# Patient Record
Sex: FEMALE | Race: BLACK OR AFRICAN AMERICAN | Employment: UNEMPLOYED | URBAN - METROPOLITAN AREA
[De-identification: names, ages, dates, MRNs, and addresses within clinical notes are randomized per-mention and may not be internally consistent; named-entity substitution may affect disease eponyms.]

---

## 2017-09-26 ENCOUNTER — OFFICE VISIT (OUTPATIENT)
Dept: FAMILY MEDICINE CLINIC | Age: 62
End: 2017-09-26

## 2017-09-26 ENCOUNTER — HOSPITAL ENCOUNTER (OUTPATIENT)
Dept: LAB | Age: 62
Discharge: HOME | End: 2017-09-26
Payer: SELF-PAY

## 2017-09-26 VITALS
TEMPERATURE: 97.8 F | RESPIRATION RATE: 18 BRPM | WEIGHT: 177 LBS | HEART RATE: 83 BPM | SYSTOLIC BLOOD PRESSURE: 114 MMHG | BODY MASS INDEX: 30.22 KG/M2 | HEIGHT: 64 IN | OXYGEN SATURATION: 95 % | DIASTOLIC BLOOD PRESSURE: 79 MMHG

## 2017-09-26 DIAGNOSIS — E78.5 HYPERLIPIDEMIA, UNSPECIFIED HYPERLIPIDEMIA TYPE: ICD-10-CM

## 2017-09-26 DIAGNOSIS — Z98.2 INTRACRANIAL SHUNT: ICD-10-CM

## 2017-09-26 DIAGNOSIS — I10 ESSENTIAL HYPERTENSION: Primary | ICD-10-CM

## 2017-09-26 DIAGNOSIS — I10 ESSENTIAL HYPERTENSION: ICD-10-CM

## 2017-09-26 DIAGNOSIS — E05.90 HYPERTHYROIDISM: ICD-10-CM

## 2017-09-26 DIAGNOSIS — N39.45 CONTINUOUS LEAKAGE OF URINE: ICD-10-CM

## 2017-09-26 DIAGNOSIS — F32.A DEPRESSION, UNSPECIFIED DEPRESSION TYPE: ICD-10-CM

## 2017-09-26 DIAGNOSIS — Z86.79 HISTORY OF INTRACRANIAL ANEURYSM: ICD-10-CM

## 2017-09-26 LAB
ALBUMIN SERPL-MCNC: 3.7 G/DL (ref 3.4–5)
ALBUMIN/GLOB SERPL: 0.9 {RATIO} (ref 0.8–1.7)
ALP SERPL-CCNC: 94 U/L (ref 45–117)
ALT SERPL-CCNC: 19 U/L (ref 13–56)
ANION GAP SERPL CALC-SCNC: 11 MMOL/L (ref 3–18)
AST SERPL-CCNC: 17 U/L (ref 15–37)
BASOPHILS # BLD: 0 K/UL (ref 0–0.06)
BASOPHILS NFR BLD: 1 % (ref 0–2)
BILIRUB SERPL-MCNC: 0.6 MG/DL (ref 0.2–1)
BUN SERPL-MCNC: 13 MG/DL (ref 7–18)
BUN/CREAT SERPL: 12 (ref 12–20)
CALCIUM SERPL-MCNC: 9.1 MG/DL (ref 8.5–10.1)
CHLORIDE SERPL-SCNC: 107 MMOL/L (ref 100–108)
CHOLEST SERPL-MCNC: 179 MG/DL
CO2 SERPL-SCNC: 24 MMOL/L (ref 21–32)
CREAT SERPL-MCNC: 1.11 MG/DL (ref 0.6–1.3)
DIFFERENTIAL METHOD BLD: ABNORMAL
EOSINOPHIL # BLD: 0.2 K/UL (ref 0–0.4)
EOSINOPHIL NFR BLD: 3 % (ref 0–5)
ERYTHROCYTE [DISTWIDTH] IN BLOOD BY AUTOMATED COUNT: 14.4 % (ref 11.6–14.5)
GLOBULIN SER CALC-MCNC: 3.9 G/DL (ref 2–4)
GLUCOSE SERPL-MCNC: 103 MG/DL (ref 74–99)
HCT VFR BLD AUTO: 40.8 % (ref 35–45)
HDLC SERPL-MCNC: 61 MG/DL (ref 40–60)
HDLC SERPL: 2.9 {RATIO} (ref 0–5)
HGB BLD-MCNC: 12.5 G/DL (ref 12–16)
LDLC SERPL CALC-MCNC: 96.8 MG/DL (ref 0–100)
LIPID PROFILE,FLP: ABNORMAL
LYMPHOCYTES # BLD: 2.3 K/UL (ref 0.9–3.6)
LYMPHOCYTES NFR BLD: 36 % (ref 21–52)
MCH RBC QN AUTO: 26.5 PG (ref 24–34)
MCHC RBC AUTO-ENTMCNC: 30.6 G/DL (ref 31–37)
MCV RBC AUTO: 86.6 FL (ref 74–97)
MONOCYTES # BLD: 0.5 K/UL (ref 0.05–1.2)
MONOCYTES NFR BLD: 7 % (ref 3–10)
NEUTS SEG # BLD: 3.4 K/UL (ref 1.8–8)
NEUTS SEG NFR BLD: 53 % (ref 40–73)
PLATELET # BLD AUTO: 264 K/UL (ref 135–420)
PMV BLD AUTO: 11.8 FL (ref 9.2–11.8)
POTASSIUM SERPL-SCNC: 4.7 MMOL/L (ref 3.5–5.5)
PROT SERPL-MCNC: 7.6 G/DL (ref 6.4–8.2)
RBC # BLD AUTO: 4.71 M/UL (ref 4.2–5.3)
SODIUM SERPL-SCNC: 142 MMOL/L (ref 136–145)
T4 FREE SERPL-MCNC: 1 NG/DL (ref 0.7–1.5)
TRIGL SERPL-MCNC: 106 MG/DL (ref ?–150)
TSH SERPL DL<=0.05 MIU/L-ACNC: 0.38 UIU/ML (ref 0.36–3.74)
VLDLC SERPL CALC-MCNC: 21.2 MG/DL
WBC # BLD AUTO: 6.3 K/UL (ref 4.6–13.2)

## 2017-09-26 PROCEDURE — 85025 COMPLETE CBC W/AUTO DIFF WBC: CPT | Performed by: FAMILY MEDICINE

## 2017-09-26 PROCEDURE — 80053 COMPREHEN METABOLIC PANEL: CPT | Performed by: FAMILY MEDICINE

## 2017-09-26 PROCEDURE — 84443 ASSAY THYROID STIM HORMONE: CPT | Performed by: FAMILY MEDICINE

## 2017-09-26 PROCEDURE — 80061 LIPID PANEL: CPT | Performed by: FAMILY MEDICINE

## 2017-09-26 PROCEDURE — 84439 ASSAY OF FREE THYROXINE: CPT | Performed by: FAMILY MEDICINE

## 2017-09-26 RX ORDER — LABETALOL 200 MG/1
TABLET, FILM COATED ORAL 2 TIMES DAILY
COMMUNITY
End: 2017-09-26 | Stop reason: SDUPTHER

## 2017-09-26 RX ORDER — PAROXETINE HYDROCHLORIDE 20 MG/1
20 TABLET, FILM COATED ORAL DAILY
Qty: 30 TAB | Refills: 3 | Status: SHIPPED | OUTPATIENT
Start: 2017-09-26 | End: 2018-02-15 | Stop reason: SDUPTHER

## 2017-09-26 RX ORDER — AMLODIPINE BESYLATE 10 MG/1
10 TABLET ORAL DAILY
Qty: 30 TAB | Refills: 3 | Status: SHIPPED | OUTPATIENT
Start: 2017-09-26 | End: 2018-03-03 | Stop reason: SDUPTHER

## 2017-09-26 RX ORDER — PAROXETINE HYDROCHLORIDE 20 MG/1
TABLET, FILM COATED ORAL DAILY
COMMUNITY
End: 2017-09-26 | Stop reason: SDUPTHER

## 2017-09-26 RX ORDER — AMLODIPINE BESYLATE 10 MG/1
TABLET ORAL DAILY
COMMUNITY
End: 2017-09-26 | Stop reason: SDUPTHER

## 2017-09-26 RX ORDER — LABETALOL 200 MG/1
200 TABLET, FILM COATED ORAL 2 TIMES DAILY
Qty: 60 TAB | Refills: 3 | Status: SHIPPED | OUTPATIENT
Start: 2017-09-26 | End: 2018-03-03 | Stop reason: SDUPTHER

## 2017-09-26 NOTE — PATIENT INSTRUCTIONS
I can follow you for your medical problems. I am referring you to urology and neurology. You will need to get any disability forms filled out by whatever specialist takes care of your disability.

## 2017-09-26 NOTE — PROGRESS NOTES
HISTORY OF PRESENT ILLNESS  Mildred Orellana is a 63 y.o. female. HPI Comments: Ms. Kirstin Hurst is here with her sister to establish care. She apparently just moved here from [place]. She has a history of a brain aneurysm/subarachnoid hemorrhage in 1997 with a subsequent shunt being placed. She also has a history of htn, depression, hyperthyroidism, all controlled with medication. She needs her 3 medications refilled today. Her medical records also indicate hyperlipidemia, but she isn't on medication for it. She also has chronic urinary incontinence and wants a referral down here to see someone. Establish Care   Pertinent negatives include no chest pain, no abdominal pain, no headaches and no shortness of breath. Review of Systems   Constitutional: Negative for chills and fever. Eyes: Negative for blurred vision and double vision. Respiratory: Negative for cough and shortness of breath. Cardiovascular: Negative for chest pain and palpitations. Gastrointestinal: Negative for abdominal pain, nausea and vomiting. Genitourinary: Negative for dysuria and urgency. Neurological: Negative for headaches. Psychiatric/Behavioral: Positive for depression and memory loss. Negative for suicidal ideas. Physical Exam   Constitutional: Vital signs are normal. She appears well-developed and well-nourished. HENT:   Right Ear: Tympanic membrane and ear canal normal.   Left Ear: Tympanic membrane and ear canal normal.   Nose: Nose normal.   Mouth/Throat: Uvula is midline, oropharynx is clear and moist and mucous membranes are normal.   Eyes: Pupils are equal, round, and reactive to light. Neck: No thyromegaly present. Cardiovascular: Normal rate, regular rhythm and normal heart sounds. Pulmonary/Chest: Effort normal and breath sounds normal. No respiratory distress. She has no wheezes. She has no rales. Abdominal: She exhibits no distension. There is no tenderness.    Neurological:   Cranial nerves 2-12 intact. Strength equal bilaterally. Negative Romberg   Skin: No rash noted. Psychiatric: She has a normal mood and affect. Nursing note and vitals reviewed. ASSESSMENT and PLAN    ICD-10-CM ICD-9-CM    1. Essential hypertension M37 007.3 METABOLIC PANEL, COMPREHENSIVE      amLODIPine (NORVASC) 10 mg tablet      labetalol (NORMODYNE) 200 mg tablet   2. Depression, unspecified depression type F32.9 311 CBC WITH AUTOMATED DIFF      PARoxetine (PAXIL) 20 mg tablet   3. Intracranial shunt Z98.2 V45.2 REFERRAL TO NEUROLOGY   4. History of intracranial aneurysm Z86.79 V12.54 REFERRAL TO NEUROLOGY   5. Hyperlipidemia, unspecified hyperlipidemia type E78.5 272.4 LIPID PANEL   6. Hyperthyroidism E05.90 242.90 TSH 3RD GENERATION      T4, FREE   7. Continuous leakage of urine N39.45 788.37 REFERRAL TO UROLOGY     She had disability papers, I told her that I just met her today, I have no idea what the answer is to 90% of these questions. I advised her that she would have to have the specialist of whatever medical problem that is causing her disability determine what her disability status is.     AVS instructions reviewed with patient, pt verbalized understanding

## 2017-09-26 NOTE — PROGRESS NOTES
Rm:1    Flu Shot Requested: pt declined    Depression Screening:  No flowsheet data found. Learning Assessment:  Learning Assessment 9/26/2017   PRIMARY LEARNER Patient   HIGHEST LEVEL OF EDUCATION - PRIMARY LEARNER  DID NOT GRADUATE HIGH SCHOOL   PRIMARY LANGUAGE ENGLISH   LEARNER PREFERENCE PRIMARY DEMONSTRATION   ANSWERED BY patient   RELATIONSHIP SELF       Abuse Screening:  No flowsheet data found. Health Maintenance reviewed and discussed per provider: n/a     Coordination of Care:    1. Have you been to the ER, urgent care clinic since your last visit? Hospitalized since your last visit? no    2. Have you seen or consulted any other health care providers outside of the [address] [place] [address] since your last visit? Include any pap smears or colon screening.  no

## 2017-09-26 NOTE — MR AVS SNAPSHOT
Visit Information Date & Time Provider Department Dept. Phone Encounter #  
 9/26/2017  9:30 AM Jonatan Bentley  [facility] [phone] [id number] Follow-up Instructions Return if symptoms worsen or fail to improve. Upcoming Health Maintenance Date Due Hepatitis C Screening 1955 DTaP/Tdap/Td series (1 - Tdap) 2/4/1978 PAP AKA CERVICAL CYTOLOGY 2/4/1978 BREAST CANCER SCRN MAMMOGRAM 2/4/2007 FOBT Q 1 YEAR AGE 50-75 2/4/2007 ZOSTER VACCINE AGE 60> 12/4/2016 INFLUENZA AGE 9 TO ADULT 8/1/2017 Allergies as of 9/26/2017  Review Complete On: 9/26/2017 By: Jonatan Bentley MD  
 No Known Allergies Current Immunizations  Never Reviewed No immunizations on file. Not reviewed this visit You Were Diagnosed With   
  
 Codes Comments Essential hypertension    -  Primary ICD-10-CM: I10 
ICD-9-CM: 401.9 Depression, unspecified depression type     ICD-10-CM: F32.9 ICD-9-CM: 460 Intracranial shunt     ICD-10-CM: Z98.2 ICD-9-CM: V45.2 History of intracranial aneurysm     ICD-10-CM: Z86.79 
ICD-9-CM: V12.54 Hyperlipidemia, unspecified hyperlipidemia type     ICD-10-CM: E78.5 ICD-9-CM: 272.4 Hyperthyroidism     ICD-10-CM: E05.90 ICD-9-CM: 242.90 Continuous leakage of urine     ICD-10-CM: N39.45 ICD-9-CM: 788.37 Vitals BP Pulse Temp Resp Height(growth percentile) Weight(growth percentile) 114/79 (BP 1 Location: Right arm, BP Patient Position: Sitting) 83 97.8 °F (36.6 °C) (Oral) 18 5' 4\" (1.626 m) 177 lb (80.3 kg) SpO2 BMI OB Status Smoking Status 95% 30.38 kg/m2 Hysterectomy Former Smoker Vitals History BMI and BSA Data Body Mass Index Body Surface Area  
 30.38 kg/m 2 1.9 m 2 Preferred Pharmacy Pharmacy Name Phone [facility], [address] [place] [address] Your Updated Medication List  
  
   
 This list is accurate as of: 9/26/17  9:47 AM.  Always use your most recent med list. amLODIPine 10 mg tablet Commonly known as:  Claudia Myers Take  by mouth daily. labetalol 200 mg tablet Commonly known as:  Raegan Purdue Take  by mouth two (2) times a day. PARoxetine 20 mg tablet Commonly known as:  PAXIL Take  by mouth daily. We Performed the Following REFERRAL TO NEUROLOGY [HUS09 Custom] REFERRAL TO UROLOGY [WOB096 Custom] Follow-up Instructions Return if symptoms worsen or fail to improve. To-Do List   
 09/26/2017 Lab:  CBC WITH AUTOMATED DIFF   
  
 09/26/2017 Lab:  LIPID PANEL   
  
 09/26/2017 Lab:  METABOLIC PANEL, COMPREHENSIVE   
  
 09/26/2017 Lab:  T4, FREE   
  
 09/26/2017 Lab:  TSH 3RD GENERATION Referral Information Referral ID Referred By Referred To  
  
 [id number] Eusebia Hernandez MD   
   [address] [facility] [address] [place] [facility], [address] Phone: [phone] Fax: [phone] Visits Status Start Date End Date 1 New Request 9/26/17 9/26/18 If your referral has a status of pending review or denied, additional information will be sent to support the outcome of this decision. Referral ID Referred By Referred To  
 [id number] Francisco Javier ALTAMIRANO MD [place], [address] Phone: [phone] Fax: [phone] Visits Status Start Date End Date 1 New Request 9/26/17 9/26/18 If your referral has a status of pending review or denied, additional information will be sent to support the outcome of this decision. Patient Instructions I can follow you for your medical problems. I am referring you to urology and neurology. You will need to get any disability forms filled out by whatever specialist takes care of your disability. Introducing [facility]! Candace Ramos introduces Sookasa patient portal. Now you can access parts of your medical record, email your doctor's office, and request medication refills online. 1. In your internet browser, go to [email or web address]. [email or web address] 2. Click on the First Time User? Click Here link in the Sign In box. You will see the New Member Sign Up page. 3. Enter your Sookasa Access Code exactly as it appears below. You will not need to use this code after youve completed the sign-up process. If you do not sign up before the expiration date, you must request a new code. · Sookasa Access Code: [email or web address] Expires: 12/25/2017  9:47 AM 
 
4. Enter the last four digits of your Social Security Number (xxxx) and Date of Birth (mm/dd/yyyy) as indicated and click Submit. You will be taken to the next sign-up page. 5. Create a Sookasa ID. This will be your Sookasa login ID and cannot be changed, so think of one that is secure and easy to remember. 6. Create a Sookasa password. You can change your password at any time. 7. Enter your Password Reset Question and Answer. This can be used at a later time if you forget your password. 8. Enter your e-mail address. You will receive e-mail notification when new information is available in [address] 9. Click Sign Up. You can now view and download portions of your medical record. 10. Click the Download Summary menu link to download a portable copy of your medical information. If you have questions, please visit the Frequently Asked Questions section of the Sookasa website. Remember, Sookasa is NOT to be used for urgent needs. For medical emergencies, dial 911. Now available from your iPhone and Android! Please provide this summary of care documentation to your next provider. If you have any questions after today's visit, please call [phone].

## 2017-09-29 NOTE — PROGRESS NOTES
Called patient to inform her of her lab results. Patient did not answer the phone and a message was not left due to a full voice mailbox.

## 2017-10-04 NOTE — PROGRESS NOTES
Patient came to the office. Patient was informed Dr. Amarilis Niño reviewed her lab results and it indicated her labs look great. Patient verbalized understanding and had no further questions.

## 2017-10-06 ENCOUNTER — OFFICE VISIT (OUTPATIENT)
Dept: UROLOGY | Age: 62
End: 2017-10-06

## 2017-10-06 VITALS
DIASTOLIC BLOOD PRESSURE: 97 MMHG | OXYGEN SATURATION: 92 % | SYSTOLIC BLOOD PRESSURE: 139 MMHG | BODY MASS INDEX: 30.22 KG/M2 | WEIGHT: 177 LBS | HEIGHT: 64 IN | HEART RATE: 78 BPM

## 2017-10-06 DIAGNOSIS — R32 URINARY INCONTINENCE, UNSPECIFIED TYPE: Primary | ICD-10-CM

## 2017-10-06 DIAGNOSIS — R35.1 NOCTURIA MORE THAN TWICE PER NIGHT: ICD-10-CM

## 2017-10-06 LAB
BILIRUB UR QL STRIP: NEGATIVE
GLUCOSE UR-MCNC: NEGATIVE MG/DL
KETONES P FAST UR STRIP-MCNC: NEGATIVE MG/DL
PH UR STRIP: 6 [PH] (ref 4.6–8)
PROT UR QL STRIP: NEGATIVE MG/DL
SP GR UR STRIP: 1.02 (ref 1–1.03)
UA UROBILINOGEN AMB POC: NORMAL (ref 0.2–1)
URINALYSIS CLARITY POC: CLEAR
URINALYSIS COLOR POC: YELLOW
URINE BLOOD POC: NORMAL
URINE LEUKOCYTES POC: NEGATIVE
URINE NITRITES POC: NEGATIVE

## 2017-10-06 RX ORDER — POTASSIUM CHLORIDE 750 MG/1
TABLET, EXTENDED RELEASE ORAL
Qty: 30 TAB | Refills: 11 | Status: SHIPPED | OUTPATIENT
Start: 2017-10-06 | End: 2018-12-08 | Stop reason: SDUPTHER

## 2017-10-06 RX ORDER — FUROSEMIDE 20 MG/1
TABLET ORAL
Qty: 30 TAB | Refills: 11 | Status: SHIPPED | OUTPATIENT
Start: 2017-10-06 | End: 2018-12-02 | Stop reason: SDUPTHER

## 2017-10-06 NOTE — PROGRESS NOTES
Ms. Nani Urrutia has a reminder for a \"due or due soon\" health maintenance. I have asked that she contact her primary care provider for follow-up on this health maintenance.

## 2017-10-06 NOTE — MR AVS SNAPSHOT
Visit Information Date & Time Provider Department Dept. Phone Encounter #  
 10/6/2017 10:45 AM Juan Humphreys, [address] [place] [address] [facility] [phone] [id number] Follow-up Instructions Return if symptoms worsen or fail to improve. Follow-up and Disposition History Upcoming Health Maintenance Date Due Hepatitis C Screening 1955 DTaP/Tdap/Td series (1 - Tdap) 2/4/1978 PAP AKA CERVICAL CYTOLOGY 2/4/1978 BREAST CANCER SCRN MAMMOGRAM 2/4/2007 FOBT Q 1 YEAR AGE 50-75 2/4/2007 ZOSTER VACCINE AGE 60> 12/4/2016 INFLUENZA AGE 9 TO ADULT 8/1/2017 Allergies as of 10/6/2017  Review Complete On: 10/6/2017 By: Juan Humphreys MD  
  
 Severity Noted Reaction Type Reactions Mushroom  10/06/2017    Swelling Current Immunizations  Never Reviewed No immunizations on file. Not reviewed this visit You Were Diagnosed With   
  
 Codes Comments Urinary incontinence, unspecified type    -  Primary ICD-10-CM: R32 
ICD-9-CM: 788.30 Nocturia more than twice per night     ICD-10-CM: R35.1 ICD-9-CM: 788.43 Vitals BP Pulse Height(growth percentile) Weight(growth percentile) SpO2 BMI  
 (!) 139/97 (BP 1 Location: Left arm, BP Patient Position: Sitting) 78 5' 4\" (1.626 m) 177 lb (80.3 kg) 92% 30.38 kg/m2 OB Status Smoking Status Hysterectomy Former Smoker Vitals History BMI and BSA Data Body Mass Index Body Surface Area  
 30.38 kg/m 2 1.9 m 2 Preferred Pharmacy Pharmacy Name Phone [facility] [address] Your Updated Medication List  
  
   
This list is accurate as of: 10/6/17  1:47 PM.  Always use your most recent med list. amLODIPine 10 mg tablet Commonly known as:  Prakash Ian Take 1 Tab by mouth daily. furosemide 20 mg tablet Commonly known as:  LASIX One tab q d 6pm  
  
 labetalol 200 mg tablet Commonly known as:  Jannetta Pore Take 1 Tab by mouth two (2) times a day. PARoxetine 20 mg tablet Commonly known as:  PAXIL Take 1 Tab by mouth daily. potassium chloride 10 mEq tablet Commonly known as:  KLOR-CON One tab q d 6 pm  
  
  
  
  
Prescriptions Sent to Pharmacy Refills  
 furosemide (LASIX) 20 mg tablet 11 Sig: One tab q d 6pm  
 Class: Normal  
 Pharmacy: [place] [address] [facility] [address] [facility], [address] [facility] [address] Ph #: [phone]  
 potassium chloride (KLOR-CON) 10 mEq tablet 11 Sig: One tab q d 6 pm  
 Class: Normal  
 Pharmacy: [facility] [address] [facility], [address] [place] [address] Ph #: [phone] We Performed the Following AMB POC URINALYSIS DIP STICK AUTO W/O MICRO [51610 CPT(R)] SC MELANIA,POST-VOID RES,US,NON-IMAGING [id number] CPT(R)] Follow-up Instructions Return if symptoms worsen or fail to improve. Patient Instructions Stress Incontinence in Women: Care Instructions Your Care Instructions Stress incontinence is the accidental release of urine caused by activities that put pressure on your bladder. It may happen most often when you sneeze, cough, laugh, jog, or lift something heavy. This condition does not cause major health problems, but it can be embarrassing and interfere with your life. Treatment can cure or improve your symptoms. Follow-up care is a key part of your treatment and safety. Be sure to make and go to all appointments, and call your doctor if you are having problems. It's also a good idea to know your test results and keep a list of the medicines you take. How can you care for yourself at home? · Take your medicines exactly as prescribed. Call your doctor if you think you are having a problem with your medicine. · Limit caffeine and alcohol. They make you urinate more.  
· Do pelvic floor (Kegel) exercises, which tighten and strengthen pelvic muscles. To do Kegel exercises: 
¨ Squeeze the same muscles you would use to stop your urine. Your belly and thighs should not move. ¨ Hold the squeeze for 3 seconds, and then relax for 3 seconds. ¨ Start with 3 seconds. Then add 1 second each week until you are able to squeeze for 10 seconds. ¨ Repeat the exercise 10 to 15 times a session. Do three or more sessions a day. · Try wearing pads that absorb leaks. Or you may want to try to prevent leaks with a product like Poise Impressa, which you insert like a tampon. · Keep skin in the genital area dry. Petroleum jelly (like Vaseline) spread on the area may help protect your skin. When should you call for help? Call your doctor now or seek immediate medical care if: 
· You develop a fever. · You feel like you need to urinate often, or you feel burning or pain when you urinate. · You have a hard time urinating when your bladder feels full. · Your urine looks bloody. Watch closely for changes in your health, and be sure to contact your doctor if: 
· Your bladder feels full even after you urinate. · You do not get better as expected. Where can you learn more? Go to http://hansa-jarrod.info/. Enter S030 in the search box to learn more about \"Stress Incontinence in Women: Care Instructions. \" Current as of: October 13, 2016 Content Version: 11.3 © 8800-1097 SocialEars. Care instructions adapted under license by Payment plugin (which disclaims liability or warranty for this information). If you have questions about a medical condition or this instruction, always ask your healthcare professional. [place] [address] any warranty or liability for your use of this information. Patient Instructions History Introducing [facility]!    
 [facility] introduces [employer] patient portal. Now you can access parts of your medical record, email your doctor's office, and request medication refills online. 1. In your internet browser, go to [email or web address] 2. Click on the First Time User? Click Here link in the Sign In box. You will see the New Member Sign Up page. 3. Enter your Mesuro Access Code exactly as it appears below. You will not need to use this code after youve completed the sign-up process. If you do not sign up before the expiration date, you must request a new code. · Mesuro Access Code: [email or web address] Expires: 12/25/2017  9:47 AM 
 
4. Enter the last four digits of your Social Security Number (xxxx) and Date of Birth (mm/dd/yyyy) as indicated and click Submit. You will be taken to the next sign-up page. 5. Create a Mesuro ID. This will be your Mesuro login ID and cannot be changed, so think of one that is secure and easy to remember. 6. Create a Mesuro password. You can change your password at any time. 7. Enter your Password Reset Question and Answer. This can be used at a later time if you forget your password. 8. Enter your e-mail address. You will receive e-mail notification when new information is available in [address] 9. Click Sign Up. You can now view and download portions of your medical record. 10. Click the Download Summary menu link to download a portable copy of your medical information. If you have questions, please visit the Frequently Asked Questions section of the Mesuro website. Remember, Mesuro is NOT to be used for urgent needs. For medical emergencies, dial 911. Now available from your iPhone and Android! Please provide this summary of care documentation to your next provider. Your primary care clinician is listed as Bladimir Henderson. If you have any questions after today's visit, please call [phone].

## 2017-10-06 NOTE — PATIENT INSTRUCTIONS
Stress Incontinence in Women: Care Instructions  Your Care Instructions  Stress incontinence is the accidental release of urine caused by activities that put pressure on your bladder. It may happen most often when you sneeze, cough, laugh, jog, or lift something heavy. This condition does not cause major health problems, but it can be embarrassing and interfere with your life. Treatment can cure or improve your symptoms. Follow-up care is a key part of your treatment and safety. Be sure to make and go to all appointments, and call your doctor if you are having problems. It's also a good idea to know your test results and keep a list of the medicines you take. How can you care for yourself at home? · Take your medicines exactly as prescribed. Call your doctor if you think you are having a problem with your medicine. · Limit caffeine and alcohol. They make you urinate more. · Do pelvic floor (Kegel) exercises, which tighten and strengthen pelvic muscles. To do Kegel exercises:  ¨ Squeeze the same muscles you would use to stop your urine. Your belly and thighs should not move. ¨ Hold the squeeze for 3 seconds, and then relax for 3 seconds. ¨ Start with 3 seconds. Then add 1 second each week until you are able to squeeze for 10 seconds. ¨ Repeat the exercise 10 to 15 times a session. Do three or more sessions a day. · Try wearing pads that absorb leaks. Or you may want to try to prevent leaks with a product like Poise Impressa, which you insert like a tampon. · Keep skin in the genital area dry. Petroleum jelly (like Vaseline) spread on the area may help protect your skin. When should you call for help? Call your doctor now or seek immediate medical care if:  · You develop a fever. · You feel like you need to urinate often, or you feel burning or pain when you urinate. · You have a hard time urinating when your bladder feels full. · Your urine looks bloody.   Watch closely for changes in your health, and be sure to contact your doctor if:  · Your bladder feels full even after you urinate. · You do not get better as expected. Where can you learn more? Go to http://hansa-jarrod.info/. Enter B632 in the search box to learn more about \"Stress Incontinence in Women: Care Instructions. \"  Current as of: October 13, 2016  Content Version: 11.3  © 5141-8961 [employer]. Care instructions adapted under license by Whereoscope (which disclaims liability or warranty for this information). If you have questions about a medical condition or this instruction, always ask your healthcare professional. [address] any warranty or liability for your use of this information.

## 2017-10-06 NOTE — PROGRESS NOTES
Felicia Lynn    Chief Complaint   Patient presents with   [address] [place] [address]    Incontinence       History and Physical    Patient is a pleasant 62-year-old -American female with an issue with nighttime urinating. During the day, she has absolutely no problems at all. She gets a normal urge to urinate and is able to get to the bathroom in time and voids with a good stream with completion and with no sense of dysuria and no hematuria and no history of urinary tract infections. There is no incontinence during the day. The patient states that her nighttime problems began when she had a stroke in 1998 and this also then prompted the placement of a ventriculoperitoneal shunt. The patient apparently has a recent diagnosis of hypertension and has a Norvasc prescription is called in but she has not yet begun it    Past Medical History:   Diagnosis Date    Arthritis     Depression     Hypercholesterolemia     Hypertension     Stroke ([facility] [address]     Thyroid disease      There is no problem list on file for this patient. Past Surgical History:   Procedure Laterality Date    HX COLONOSCOPY      HX GYN      HX HYSTERECTOMY      HX INTRACRANIAL ANEURYSM REPAIR       Current Outpatient Prescriptions   Medication Sig Dispense Refill    amLODIPine (NORVASC) 10 mg tablet Take 1 Tab by mouth daily. 30 Tab 3    PARoxetine (PAXIL) 20 mg tablet Take 1 Tab by mouth daily. 30 Tab 3    labetalol (NORMODYNE) 200 mg tablet Take 1 Tab by mouth two (2) times a day. 60 Tab 3     Allergies   Allergen Reactions    Mushroom Swelling     Social History     Social History    Marital status: SINGLE     Spouse name: N/A    Number of children: N/A    Years of education: N/A     Occupational History    Not on file.      Social History Main Topics    Smoking status: Former Smoker    Smokeless tobacco: Never Used    Alcohol use No    Drug use: No    Sexual activity: Yes     Partners: Male     Birth control/ protection: Condom Other Topics Concern    Not on file     Social History Narrative      Family History   Problem Relation Age of Onset    Cancer Mother              Visit Vitals    BP (!) 139/97 (BP 1 Location: Left arm, BP Patient Position: Sitting)    Pulse 78    Ht 5' 4\" (1.626 m)    Wt 177 lb (80.3 kg)    SpO2 92%    BMI 30.38 kg/m2     Physical        Gen: WDWN adult NAD  Head  : normocephalic,  Normal ROM; eyes without normal pupils, EOMs, no masses;  conjunctiva normal  Neck: normal movement,  no evident mass,  No evident adenopathy, trachea midline,  Lungs clear to auscultation with no rales or ronchi or rubs  Cardiac NSR with no murmur, rub, extra sounds  Abd :bowel sounds normal, no masses, tenderness, organomegaly  Flanks  nontender to punch percussion and to bimanual renal fossa palpation  -introitus normal.  Urethral meatus normal.  Urethra palpates normally. Bimanual palpation reveals no bladder masses    Extremities there is 2+ pitting edema up to the patella  Psych- oriented, no evident anxiety, no cognitive impairment evident    Bladder scan reveals 23 cc residual  Urine is trace positive for blood but on microscopic I only see 0-2 red cells and no casts                  Impression/ PLAN  Nighttime high-volume polyuria. The patient is afraid to sleep because she will have incontinence at those times  With pitting edema the patient obviously has lower extremity fluid sequestration    Plan:  Fully discussed with patient and  I am going to start the patient on Lasix to be taken at 6 PM.  Potassium chloride also given. The patient is aware of the impact on the blood pressure. If the patient continues to have high-volume nocturia we need to consider whether or not to give DDAVP to directly impact the brain.   If there is continued nighttime incontinence with correction of the volumes, we would then consider anticholinergics at bedtime              This visit exceeded 30 minutes and >50% was counselling  The patient understands the discussion and plan    PLEASE NOTE:      This document has been produced using voice recognition software.   Unrecognized errors in transcription may be present    Sudeep Uribe MD

## 2017-10-06 NOTE — PROGRESS NOTES
Ms. Kwesi Faulkner has a reminder for a \"due or due soon\" health maintenance. I have asked that she contact her primary care provider for follow-up on this health maintenance.

## 2017-10-27 ENCOUNTER — HOSPITAL ENCOUNTER (EMERGENCY)
Age: 62
Discharge: HOME | End: 2017-10-27
Attending: EMERGENCY MEDICINE
Payer: MEDICARE

## 2017-10-27 VITALS
DIASTOLIC BLOOD PRESSURE: 82 MMHG | SYSTOLIC BLOOD PRESSURE: 110 MMHG | RESPIRATION RATE: 16 BRPM | HEART RATE: 90 BPM | OXYGEN SATURATION: 100 % | TEMPERATURE: 97.8 F | WEIGHT: 174 LBS | BODY MASS INDEX: 29.87 KG/M2

## 2017-10-27 DIAGNOSIS — R19.7 DIARRHEA, UNSPECIFIED TYPE: ICD-10-CM

## 2017-10-27 DIAGNOSIS — R11.2 NON-INTRACTABLE VOMITING WITH NAUSEA, UNSPECIFIED VOMITING TYPE: Primary | ICD-10-CM

## 2017-10-27 DIAGNOSIS — R53.83 FATIGUE, UNSPECIFIED TYPE: ICD-10-CM

## 2017-10-27 LAB
ALBUMIN SERPL-MCNC: 3.8 G/DL (ref 3.4–5)
ALBUMIN/GLOB SERPL: 0.8 {RATIO} (ref 0.8–1.7)
ALP SERPL-CCNC: 106 U/L (ref 45–117)
ALT SERPL-CCNC: 29 U/L (ref 13–56)
ANION GAP SERPL CALC-SCNC: 7 MMOL/L (ref 3–18)
AST SERPL-CCNC: 19 U/L (ref 15–37)
BASOPHILS # BLD: 0 K/UL (ref 0–0.06)
BASOPHILS NFR BLD: 0 % (ref 0–2)
BILIRUB DIRECT SERPL-MCNC: 0.2 MG/DL (ref 0–0.2)
BILIRUB SERPL-MCNC: 0.8 MG/DL (ref 0.2–1)
BUN SERPL-MCNC: 26 MG/DL (ref 7–18)
BUN/CREAT SERPL: 19 (ref 12–20)
CALCIUM SERPL-MCNC: 9.2 MG/DL (ref 8.5–10.1)
CHLORIDE SERPL-SCNC: 109 MMOL/L (ref 100–108)
CO2 SERPL-SCNC: 26 MMOL/L (ref 21–32)
CREAT SERPL-MCNC: 1.34 MG/DL (ref 0.6–1.3)
DIFFERENTIAL METHOD BLD: ABNORMAL
EOSINOPHIL # BLD: 0 K/UL (ref 0–0.4)
EOSINOPHIL NFR BLD: 0 % (ref 0–5)
ERYTHROCYTE [DISTWIDTH] IN BLOOD BY AUTOMATED COUNT: 13.9 % (ref 11.6–14.5)
GLOBULIN SER CALC-MCNC: 4.6 G/DL (ref 2–4)
GLUCOSE SERPL-MCNC: 129 MG/DL (ref 74–99)
HCT VFR BLD AUTO: 42.2 % (ref 35–45)
HGB BLD-MCNC: 13.4 G/DL (ref 12–16)
LIPASE SERPL-CCNC: 454 U/L (ref 73–393)
LYMPHOCYTES # BLD: 0.7 K/UL (ref 0.9–3.6)
LYMPHOCYTES NFR BLD: 9 % (ref 21–52)
MCH RBC QN AUTO: 26.9 PG (ref 24–34)
MCHC RBC AUTO-ENTMCNC: 31.8 G/DL (ref 31–37)
MCV RBC AUTO: 84.6 FL (ref 74–97)
MONOCYTES # BLD: 0.2 K/UL (ref 0.05–1.2)
MONOCYTES NFR BLD: 3 % (ref 3–10)
NEUTS SEG # BLD: 6.6 K/UL (ref 1.8–8)
NEUTS SEG NFR BLD: 88 % (ref 40–73)
PLATELET # BLD AUTO: 244 K/UL (ref 135–420)
PMV BLD AUTO: 11.2 FL (ref 9.2–11.8)
POTASSIUM SERPL-SCNC: 4 MMOL/L (ref 3.5–5.5)
PROT SERPL-MCNC: 8.4 G/DL (ref 6.4–8.2)
RBC # BLD AUTO: 4.99 M/UL (ref 4.2–5.3)
SODIUM SERPL-SCNC: 142 MMOL/L (ref 136–145)
WBC # BLD AUTO: 7.5 K/UL (ref 4.6–13.2)

## 2017-10-27 PROCEDURE — 85025 COMPLETE CBC W/AUTO DIFF WBC: CPT | Performed by: EMERGENCY MEDICINE

## 2017-10-27 PROCEDURE — 99281 EMR DPT VST MAYX REQ PHY/QHP: CPT

## 2017-10-27 PROCEDURE — 80048 BASIC METABOLIC PNL TOTAL CA: CPT | Performed by: EMERGENCY MEDICINE

## 2017-10-27 PROCEDURE — 80076 HEPATIC FUNCTION PANEL: CPT | Performed by: EMERGENCY MEDICINE

## 2017-10-27 PROCEDURE — 83690 ASSAY OF LIPASE: CPT | Performed by: EMERGENCY MEDICINE

## 2017-10-27 RX ORDER — ONDANSETRON 4 MG/1
8 TABLET, FILM COATED ORAL
Qty: 12 TAB | Refills: 0 | Status: SHIPPED | OUTPATIENT
Start: 2017-10-27

## 2017-10-27 NOTE — DISCHARGE INSTRUCTIONS
SPECIFIC PATIENT INSTRUCTIONS FROM THE PHYSICIAN WHO TREATED YOU IN THE ER TODAY:  1. Zofran for nausea and/or vomiting. 2. Over the counter imodium for diarrhea. 3. FOLLOW UP APPOINTMENT:  Your primary doctor in 3-4 days. Diarrhea: Care Instructions  Your Care Instructions    Diarrhea is loose, watery stools (bowel movements). The exact cause is often hard to find. Sometimes diarrhea is your body's way of getting rid of what caused an upset stomach. Viruses, food poisoning, and many medicines can cause diarrhea. Some people get diarrhea in response to emotional stress, anxiety, or certain foods. Almost everyone has diarrhea now and then. It usually isn't serious, and your stools will return to normal soon. The important thing to do is replace the fluids you have lost, so you can prevent dehydration. The doctor has checked you carefully, but problems can develop later. If you notice any problems or new symptoms, get medical treatment right away. Follow-up care is a key part of your treatment and safety. Be sure to make and go to all appointments, and call your doctor if you are having problems. It's also a good idea to know your test results and keep a list of the medicines you take. How can you care for yourself at home? · Watch for signs of dehydration, which means your body has lost too much water. Dehydration is a serious condition and should be treated right away. Signs of dehydration are:  ¨ Increasing thirst and dry eyes and mouth. ¨ Feeling faint or lightheaded. ¨ Darker urine, and a smaller amount of urine than normal.  · To prevent dehydration, drink plenty of fluids, enough so that your urine is light yellow or clear like water. Choose water and other caffeine-free clear liquids until you feel better. If you have kidney, heart, or liver disease and have to limit fluids, talk with your doctor before you increase the amount of fluids you drink.   · Begin eating small amounts of mild foods the next day, if you feel like it. ¨ Try yogurt that has live cultures of Lactobacillus. (Check the label.)  ¨ Avoid spicy foods, fruits, alcohol, and caffeine until 48 hours after all symptoms are gone. ¨ Avoid chewing gum that contains sorbitol. ¨ Avoid dairy products (except for yogurt with Lactobacillus) while you have diarrhea and for 3 days after symptoms are gone. · The doctor may recommend that you take over-the-counter medicine, such as loperamide (Imodium), if you still have diarrhea after 6 hours. Read and follow all instructions on the label. Do not use this medicine if you have bloody diarrhea, a high fever, or other signs of serious illness. Call your doctor if you think you are having a problem with your medicine. When should you call for help? Call 911 anytime you think you may need emergency care. For example, call if:  ? · You passed out (lost consciousness). ? · Your stools are maroon or very bloody. ?Call your doctor now or seek immediate medical care if:  ? · You are dizzy or lightheaded, or you feel like you may faint. ? · Your stools are black and look like tar, or they have streaks of blood. ? · You have new or worse belly pain. ? · You have symptoms of dehydration, such as:  ¨ Dry eyes and a dry mouth. ¨ Passing only a little dark urine. ¨ Feeling thirstier than usual.   ? · You have a new or higher fever. ? Watch closely for changes in your health, and be sure to contact your doctor if:  ? · Your diarrhea is getting worse. ? · You see pus in the diarrhea. ? · You are not getting better after 2 days (48 hours). Where can you learn more? Go to http://hansa-jarrod.info/. Enter E866 in the search box to learn more about \"Diarrhea: Care Instructions. \"  Current as of: March 20, 2017  Content Version: 11.4  © 8170-6080 [employer].  Care instructions adapted under license by [employer] (which disclaims liability or warranty for this information). If you have questions about a medical condition or this instruction, always ask your healthcare professional. [address] any warranty or liability for your use of this information. Nausea and Vomiting: Care Instructions  Your Care Instructions    When you are nauseated, you may feel weak and sweaty and notice a lot of saliva in your mouth. Nausea often leads to vomiting. Most of the time you do not need to worry about nausea and vomiting, but they can be signs of other illnesses. Two common causes of nausea and vomiting are stomach flu and food poisoning. Nausea and vomiting from viral stomach flu will usually start to improve within 24 hours. Nausea and vomiting from food poisoning may last from 12 to 48 hours. The doctor has checked you carefully, but problems can develop later. If you notice any problems or new symptoms, get medical treatment right away. Follow-up care is a key part of your treatment and safety. Be sure to make and go to all appointments, and call your doctor if you are having problems. It's also a good idea to know your test results and keep a list of the medicines you take. How can you care for yourself at home? · To prevent dehydration, drink plenty of fluids, enough so that your urine is light yellow or clear like water. Choose water and other caffeine-free clear liquids until you feel better. If you have kidney, heart, or liver disease and have to limit fluids, talk with your doctor before you increase the amount of fluids you drink. · Rest in bed until you feel better. · When you are able to eat, try clear soups, mild foods, and liquids until all symptoms are gone for 12 to 48 hours. Other good choices include dry toast, crackers, cooked cereal, and gelatin dessert, such as Jell-O. When should you call for help? Call 911 anytime you think you may need emergency care.  For example, call if:  ? · You passed out (lost consciousness). ?Call your doctor now or seek immediate medical care if:  ? · You have symptoms of dehydration, such as:  ¨ Dry eyes and a dry mouth. ¨ Passing only a little dark urine. ¨ Feeling thirstier than usual.   ? · You have new or worsening belly pain. ? · You have a new or higher fever. ? · You vomit blood or what looks like coffee grounds. ? Watch closely for changes in your health, and be sure to contact your doctor if:  ? · You have ongoing nausea and vomiting. ? · Your vomiting is getting worse. ? · Your vomiting lasts longer than 2 days. ? · You are not getting better as expected. Where can you learn more? Go to http://hanas-jarrod.info/. Enter 25 [id number] in the search box to learn more about \"Nausea and Vomiting: Care Instructions. \"  Current as of: March 20, 2017  Content Version: 11.4  © 1802-7833 [employer]. Care instructions adapted under license by [employer] (which disclaims liability or warranty for this information). If you have questions about a medical condition or this instruction, always ask your healthcare professional. [place] [address] any warranty or liability for your use of this information. Fatigue: Care Instructions  Your Care Instructions    Fatigue is a feeling of tiredness, exhaustion, or lack of energy. You may feel fatigue because of too much or not enough activity. It can also come from stress, lack of sleep, boredom, and poor diet. Many medical problems, such as viral infections, can cause fatigue. Emotional problems, especially depression, are often the cause of fatigue. Fatigue is most often a symptom of another problem. Treatment for fatigue depends on the cause. For example, if you have fatigue because you have a certain health problem, treating this problem also treats your fatigue. If depression or anxiety is the cause, treatment may help.   Follow-up care is a key part of your treatment and safety. Be sure to make and go to all appointments, and call your doctor if you are having problems. It's also a good idea to know your test results and keep a list of the medicines you take. How can you care for yourself at home? · Get regular exercise. But don't overdo it. Go back and forth between rest and exercise. · Get plenty of rest.  · Eat a healthy diet. Do not skip meals, especially breakfast.  · Reduce your use of caffeine, tobacco, and alcohol. Caffeine is most often found in coffee, tea, cola drinks, and chocolate. · Limit medicines that can cause fatigue. This includes tranquilizers and cold and allergy medicines. When should you call for help? Watch closely for changes in your health, and be sure to contact your doctor if:  ? · You have new symptoms such as fever or a rash. ? · Your fatigue gets worse. ? · You have been feeling down, depressed, or hopeless. Or you may have lost interest in things that you usually enjoy. ? · You are not getting better as expected. Where can you learn more? Go to http://hansa-jarrod.info/. Enter H963 in the search box to learn more about \"Fatigue: Care Instructions. \"  Current as of: March 20, 2017  Content Version: 11.4  © 1601-3668 [employer]. Care instructions adapted under license by [employer] (which disclaims liability or warranty for this information). If you have questions about a medical condition or this instruction, always ask your healthcare professional. [place] [address] any warranty or liability for your use of this information. Ed[employer]on Activation    Thank you for requesting access to [employer]. Please follow the instructions below to securely access and download your online medical record. Ed[employer]on allows you to send messages to your doctor, view your test results, renew your prescriptions, schedule appointments, and more. How Do I Sign Up? 1.  In your internet browser, go to [email or web address]. 2. Click on the First Time User? Click Here link in the Sign In box. You will see the New Member Sign Up page. 3. Enter your KonaWare Access Code exactly as it appears below. You will not need to use this code after youve completed the sign-up process. If you do not sign up before the expiration date, you must request a new code. KonaWare Access Code: [email or web address]  Expires: 2017  9:47 AM (This is the date your KonaWare access code will )    4. Enter the last four digits of your Social Security Number (xxxx) and Date of Birth (mm/dd/yyyy) as indicated and click Submit. You will be taken to the next sign-up page. 5. Create a KonaWare ID. This will be your KonaWare login ID and cannot be changed, so think of one that is secure and easy to remember. 6. Create a KonaWare password. You can change your password at any time. 7. Enter your Password Reset Question and Answer. This can be used at a later time if you forget your password. 8. Enter your e-mail address. You will receive e-mail notification when new information is available in [address] 9. Click Sign Up. You can now view and download portions of your medical record. 10. Click the Download Summary menu link to download a portable copy of your medical information. Additional Information    If you have questions, please visit the Frequently Asked Questions section of the KonaWare website at [email or web address] [email or web address]. [employer] Document [email or web address] Remember, KonaWare is NOT to be used for urgent needs. For medical emergencies, dial 911.

## 2017-10-27 NOTE — ED PROVIDER NOTES
[facility] EMERGENCY DEPT      63 y.o. female with noted past medical history who presents to the emergency department c/o fatigue, vomiting, and diarrhea onset today. Patient describes her fatigue as moderate and constant. Patient states that she felt normal after waking up this morning, but then she suddenly vomited and has had diarrhea all day. She vomited 1x. She notes that she has been in contact with her sick relative who also had diarrhea and vomiting but has now improved. Patient denies any other symptoms. No other complaints. Nursing nurses regarding the HPI and triage nursing notes were reviewed. No current facility-administered medications for this encounter. Current Outpatient Prescriptions   Medication Sig    furosemide (LASIX) 20 mg tablet One tab q d 6pm    potassium chloride (KLOR-CON) 10 mEq tablet One tab q d 6 pm    amLODIPine (NORVASC) 10 mg tablet Take 1 Tab by mouth daily.  PARoxetine (PAXIL) 20 mg tablet Take 1 Tab by mouth daily.  labetalol (NORMODYNE) 200 mg tablet Take 1 Tab by mouth two (2) times a day. Past Medical History:   Diagnosis Date    Arthritis     Depression     Hypercholesterolemia     Hypertension     Stroke ([address]     Thyroid disease        Past Surgical History:   Procedure Laterality Date    HX COLONOSCOPY      HX GYN      HX HYSTERECTOMY      HX INTRACRANIAL ANEURYSM REPAIR         Family History   Problem Relation Age of Onset    Cancer Mother        Social History     Social History    Marital status: SINGLE     Spouse name: N/A    Number of children: N/A    Years of education: N/A     Occupational History    Not on file.      Social History Main Topics    Smoking status: Former Smoker    Smokeless tobacco: Never Used    Alcohol use No    Drug use: No    Sexual activity: Yes     Partners: Male     Birth control/ protection: Condom     Other Topics Concern    Not on file     Social History Narrative       Allergies Allergen Reactions    Mushroom Swelling       Patient's primary care provider (as noted in EPIC):  Jill Grady MD    REVIEW OF SYSTEMS:    Constitutional:  Negative for diaphoresis. Eyes:  Negative for diploplia. HENT:  Negative for congestion. Respiratory:  Negative for stridor. Cardiovascular:  Negative for palpitations. Gastrointestinal:  Negative for diarrhea. Genitourinary:  Negative for flank pain. Musculoskeletal:  Negative for back pain. Skin:  Negative for pallor. Neurological:  Negative for weakness. Psychiatric:  Negative for hallucinations. Visit Vitals    /82 (BP 1 Location: Right arm, BP Patient Position: At rest)    Pulse 90    Temp 97.8 °F (36.6 °C)    Resp 16    Wt 78.9 kg (174 lb)    SpO2 100%    BMI 29.87 kg/m2       PHYSICAL EXAM:    CONSTITUTIONAL:  Alert, in no apparent distress;  well developed;  well nourished. HEAD:  Normocephalic, atraumatic. EYES:  EOMI. Non-icteric sclera. Normal conjunctiva. ENTM:  Nose:  no rhinorrhea. Throat:  no erythema or exudate, mucous membranes moist.  NECK:  No JVD. Supple  RESPIRATORY:  Chest clear, equal breath sounds, good air movement. CARDIOVASCULAR:  Regular rate and rhythm. No murmurs, rubs, or gallops. GI:  Normal bowel sounds, abdomen soft and non-tender. No rebound or guarding. No palpable masses. BACK:  Non-tender. UPPER EXT:  Normal inspection. LOWER EXT:  No edema, no calf tenderness. Distal pulses intact. NEURO:  Moves all four extremities, and grossly normal motor exam.  SKIN:  No rashes;  Normal for age. PSYCH:  Alert and normal affect. DIFFERENTIAL DIAGNOSES/ MEDICAL DECISION MAKING:  Viral vomiting and diarrhea, food poisoning, bacterial vomiting and diarrhea.   Lower on differential diagnosis in this patient is early small bowel obstruction (given diarrhea as well), Clostridium difficile related diarrhea, irritable bowel syndrome, crohn's disease, or ulcerative colitis. Abnormal lab results from this emergency department encounter:  Labs Reviewed   CBC WITH AUTOMATED DIFF - Abnormal; Notable for the following:        Result Value    NEUTROPHILS 88 (*)     LYMPHOCYTES 9 (*)     ABS. LYMPHOCYTES 0.7 (*)     All other components within normal limits   METABOLIC PANEL, BASIC - Abnormal; Notable for the following:     Chloride 109 (*)     Glucose 129 (*)     BUN 26 (*)     Creatinine 1.34 (*)     GFR est AA 49 (*)     GFR est non-AA 40 (*)     All other components within normal limits   LIPASE - Abnormal; Notable for the following:     Lipase 454 (*)     All other components within normal limits   HEPATIC FUNCTION PANEL - Abnormal; Notable for the following:     Protein, total 8.4 (*)     Globulin 4.6 (*)     All other components within normal limits       Lab values for this patient within approximately the last 12 hours:  Recent Results (from the past 12 hour(s))   CBC WITH AUTOMATED DIFF    Collection Time: 10/27/17  3:55 PM   Result Value Ref Range    WBC 7.5 4.6 - 13.2 K/uL    RBC 4.99 4.20 - 5.30 M/uL    HGB 13.4 12.0 - 16.0 g/dL    HCT 42.2 35.0 - 45.0 %    MCV 84.6 74.0 - 97.0 FL    MCH 26.9 24.0 - 34.0 PG    MCHC 31.8 31.0 - 37.0 g/dL    RDW 13.9 11.6 - 14.5 %    PLATELET 863 524 - 654 K/uL    MPV 11.2 9.2 - 11.8 FL    NEUTROPHILS 88 (H) 40 - 73 %    LYMPHOCYTES 9 (L) 21 - 52 %    MONOCYTES 3 3 - 10 %    EOSINOPHILS 0 0 - 5 %    BASOPHILS 0 0 - 2 %    ABS. NEUTROPHILS 6.6 1.8 - 8.0 K/UL    ABS. LYMPHOCYTES 0.7 (L) 0.9 - 3.6 K/UL    ABS. MONOCYTES 0.2 0.05 - 1.2 K/UL    ABS. EOSINOPHILS 0.0 0.0 - 0.4 K/UL    ABS.  BASOPHILS 0.0 0.0 - 0.06 K/UL    DF AUTOMATED     METABOLIC PANEL, BASIC    Collection Time: 10/27/17  3:55 PM   Result Value Ref Range    Sodium 142 136 - 145 mmol/L    Potassium 4.0 3.5 - 5.5 mmol/L    Chloride 109 (H) 100 - 108 mmol/L    CO2 26 21 - 32 mmol/L    Anion gap 7 3.0 - 18 mmol/L    Glucose 129 (H) 74 - 99 mg/dL    BUN 26 (H) 7.0 - 18 MG/DL Creatinine 1.34 (H) 0.6 - 1.3 MG/DL    BUN/Creatinine ratio 19 12 - 20      GFR est AA 49 (L) >60 ml/min/1.73m2    GFR est non-AA 40 (L) >60 ml/min/1.73m2    Calcium 9.2 8.5 - 10.1 MG/DL   LIPASE    Collection Time: 10/27/17  3:55 PM   Result Value Ref Range    Lipase 454 (H) 73 - 393 U/L   HEPATIC FUNCTION PANEL    Collection Time: 10/27/17  3:55 PM   Result Value Ref Range    Protein, total 8.4 (H) 6.4 - 8.2 g/dL    Albumin 3.8 3.4 - 5.0 g/dL    Globulin 4.6 (H) 2.0 - 4.0 g/dL    A-G Ratio 0.8 0.8 - 1.7      Bilirubin, total 0.8 0.2 - 1.0 MG/DL    Bilirubin, direct 0.2 0.0 - 0.2 MG/DL    Alk. phosphatase 106 45 - 117 U/L    AST (SGOT) 19 15 - 37 U/L    ALT (SGPT) 29 13 - 56 U/L       Radiologist and cardiologist interpretations if available at time of this note:  No results found. Medication(s) ordered for patient during this emergency visit encounter:  Medications - No data to display    ED COURSE:      IMPRESSION AND MEDICAL DECISION MAKING:  Based upon the patient's presentation with noted HPI and PE, along with the work up done in the emergency department, I believe that the patient is having vomiting, diarrhea, and abdominal pain from gastroenteritis. I do believe though that the patient is well enough for discharge home. Will prescribe zofran for nausea and vomiting, and lomotil for diarrhea. If vicodin was prescribed, only a short course was prescribed for breakthrough pain. DIAGNOSIS:  1. Vomiting x 1 episode  2. Diarrhea x 2 epsode. 3. Fatigue  4. Probable gastroenteritis     SPECIFIC PATIENT INSTRUCTIONS FROM THE PHYSICIAN WHO TREATED YOU IN THE ER TODAY:  1. Zofran for nausea and/or vomiting. 2. Over the counter imodium for diarrhea. 3. FOLLOW UP APPOINTMENT:  Your primary doctor in 3-4 days. Scribe Attestation     Geno Montejo acting as a scribe for and in the presence of Jazmyne Guillen MD  October 27, 2017 at 4:04 PM    SHAYLA Stiles.D. Provider Attestation:   If a scribe was utilized in generation of this patient record, I personally performed the services described in the documentation, reviewed the documentation, as recorded by the scribe in my presence, and it accurately records the patient's history of presenting illness, review of systems, patient physical examination, and procedures performed by me as the attending physician. Luc Euceda M.D.   [facility] Board Certified Emergency Physician  10/27/2017.  3:48 PM

## 2017-10-31 ENCOUNTER — HOSPITAL ENCOUNTER (OUTPATIENT)
Dept: MAMMOGRAPHY | Age: 62
Discharge: HOME | End: 2017-10-31
Attending: FAMILY MEDICINE
Payer: MEDICARE

## 2017-10-31 DIAGNOSIS — Z12.39 BREAST CANCER SCREENING: ICD-10-CM

## 2017-10-31 PROCEDURE — 77063 BREAST TOMOSYNTHESIS BI: CPT

## 2018-02-02 ENCOUNTER — OFFICE VISIT (OUTPATIENT)
Dept: FAMILY MEDICINE CLINIC | Age: 63
End: 2018-02-02

## 2018-02-02 ENCOUNTER — HOSPITAL ENCOUNTER (OUTPATIENT)
Dept: LAB | Age: 63
Discharge: HOME | End: 2018-02-02
Payer: SELF-PAY

## 2018-02-02 VITALS
HEART RATE: 72 BPM | SYSTOLIC BLOOD PRESSURE: 97 MMHG | WEIGHT: 178 LBS | RESPIRATION RATE: 18 BRPM | TEMPERATURE: 97.2 F | HEIGHT: 64 IN | OXYGEN SATURATION: 96 % | BODY MASS INDEX: 30.39 KG/M2 | DIASTOLIC BLOOD PRESSURE: 74 MMHG

## 2018-02-02 DIAGNOSIS — Z79.899 HIGH RISK MEDICATION USE: ICD-10-CM

## 2018-02-02 DIAGNOSIS — Z86.79 HISTORY OF ANEURYSM: ICD-10-CM

## 2018-02-02 DIAGNOSIS — E66.9 OBESITY (BMI 30-39.9): ICD-10-CM

## 2018-02-02 DIAGNOSIS — Z98.2 INTRACRANIAL SHUNT: Primary | ICD-10-CM

## 2018-02-02 LAB
ANION GAP SERPL CALC-SCNC: 8 MMOL/L (ref 3–18)
BUN SERPL-MCNC: 21 MG/DL (ref 7–18)
BUN/CREAT SERPL: 15 (ref 12–20)
CALCIUM SERPL-MCNC: 9.4 MG/DL (ref 8.5–10.1)
CHLORIDE SERPL-SCNC: 108 MMOL/L (ref 100–108)
CO2 SERPL-SCNC: 27 MMOL/L (ref 21–32)
CREAT SERPL-MCNC: 1.38 MG/DL (ref 0.6–1.3)
GLUCOSE SERPL-MCNC: 99 MG/DL (ref 74–99)
POTASSIUM SERPL-SCNC: 4.9 MMOL/L (ref 3.5–5.5)
SODIUM SERPL-SCNC: 143 MMOL/L (ref 136–145)

## 2018-02-02 PROCEDURE — 80048 BASIC METABOLIC PNL TOTAL CA: CPT | Performed by: PHYSICIAN ASSISTANT

## 2018-02-02 NOTE — MR AVS SNAPSHOT
[address] [address] 
[phone] Patient: Tyrone Delarosa MRN: [id number] ODZ:[id number] Visit Information Date & Time Provider Department Dept. Phone Encounter #  
 2/2/2018 11:00 AM Ayo Ayala PA-C [facility] [phone] [id number] Follow-up Instructions Return in about 2 months (around 4/2/2018) for fasting, lipid recheck. Upcoming Health Maintenance Date Due Hepatitis C Screening 1955 DTaP/Tdap/Td series (1 - Tdap) 2/4/1978 PAP AKA CERVICAL CYTOLOGY 2/4/1978 FOBT Q 1 YEAR AGE 50-75 2/4/2007 ZOSTER VACCINE AGE 60> 12/4/2016 Influenza Age 5 to Adult 8/1/2017 BREAST CANCER SCRN MAMMOGRAM 10/31/2019 Allergies as of 2/2/2018  Review Complete On: 2/2/2018 By: Marleny Jones LPN Severity Noted Reaction Type Reactions Mushroom  10/06/2017    Swelling Current Immunizations  Never Reviewed No immunizations on file. Not reviewed this visit You Were Diagnosed With   
  
 Codes Comments Intracranial shunt    -  Primary ICD-10-CM: B84.5 ICD-9-CM: V45.2 History of aneurysm     ICD-10-CM: Z86.79 
ICD-9-CM: V12.59 High risk medication use     ICD-10-CM: Z79.899 ICD-9-CM: V58.69 Vitals BP Pulse Temp Resp Height(growth percentile) Weight(growth percentile) 97/74 (BP 1 Location: Right arm, BP Patient Position: Sitting) 72 97.2 °F (36.2 °C) (Oral) 18 5' 4\" (1.626 m) 178 lb (80.7 kg) SpO2 BMI OB Status Smoking Status 96% 30.55 kg/m2 Hysterectomy Former Smoker Vitals History BMI and BSA Data Body Mass Index Body Surface Area 30.55 kg/m 2 1.91 m 2 Preferred Pharmacy Pharmacy Name Phone [facility], [address] Your Updated Medication List  
  
   
This list is accurate as of: 2/2/18 11:34 AM.  Always use your most recent med list.  
 amLODIPine 10 mg tablet Commonly known as:  Barbara Prow Take 1 Tab by mouth daily. furosemide 20 mg tablet Commonly known as:  LASIX One tab q d 6pm  
  
 labetalol 200 mg tablet Commonly known as:  Tillman Caroli Take 1 Tab by mouth two (2) times a day. ondansetron hcl 4 mg tablet Commonly known as:  ZOFRAN (AS HYDROCHLORIDE) Take 2 Tabs by mouth every eight (8) hours as needed for Nausea. PARoxetine 20 mg tablet Commonly known as:  PAXIL Take 1 Tab by mouth daily. potassium chloride 10 mEq tablet Commonly known as:  KLOR-CON One tab q d 6 pm  
  
  
  
  
We Performed the Following REFERRAL TO NEUROLOGY [PYF93 Custom] Comments:  
 Please evaluate patient for maintenance of shunt s/p stroke. Follow-up Instructions Return in about 2 months (around 4/2/2018) for fasting, lipid recheck. Referral Information Referral ID Referred By Referred To  
  
 [id number] ESPINOZA DODD MD   
   [address] [place] [address] [address] [place] [address] Phone: [phone] Fax: [phone] Visits Status Start Date End Date 1 New Request 2/2/18 2/2/19 If your referral has a status of pending review or denied, additional information will be sent to support the outcome of this decision. Patient Instructions 4 Ways to Lose Weight Without Counting Calories: 
(from the [facility] [employer] channel) Https://youtu. be/iGv1vmvQKhK 1. Reduce carbohydrate intake 2. Use smaller plates 3. Replace high-carb breakfast with eggs and veggies 4. Get good sleep 
(don't forget to watch the video!) 7 Weight Loss Tips That Are Truly Science-Based (from the [facility]) 
https://youtu. be/EzMtWKnku1v 
 
 
5 Simple Steps to Boost Metabolism (from the [facility] [employer] channel) 
https://youtu. RF/QI52UZW196H 1. Eat protein at every meal 
2. Lift heavy things 3. High intensity workouts 4. Drink green tea 5. Get a good night's sleep The importance of sleep for weight loss: 
http://Kleermail.TeePee Games/health/engineering-the-alpha-excerpt Protein at breakfast: 
http://[facility]. com/protein-at-breakfast-and-weight-loss/ How Eating More Protein Helps You Lose Weight 
https://youtu. be/t8zCYk5Nran Introducing [address]! Terrence Ramirez introduces [employer] patient portal. Now you can access parts of your medical record, email your doctor's office, and request medication refills online. 1. In your internet browser, go to https://Black Sand Technologies. Tradeasi Solutions/Black Sand [employer] 2. Click on the First Time User? Click Here link in the Sign In box. You will see the New Member Sign Up page. 3. Enter your [employer] Access Code exactly as it appears below. You will not need to use this code after youve completed the sign-up process. If you do not sign up before the expiration date, you must request a new code. · [employer] Access Code: [email or web address] Expires: 5/3/2018 11:03 AM 
 
4. Enter the last four digits of your Social Security Number (xxxx) and Date of Birth (mm/dd/yyyy) as indicated and click Submit. You will be taken to the next sign-up page. 5. Create a [employer]ia ID. This will be your [employer] login ID and cannot be changed, so think of one that is secure and easy to remember. 6. Create a [employer] password. You can change your password at any time. 7. Enter your Password Reset Question and Answer. This can be used at a later time if you forget your password. 8. Enter your e-mail address. You will receive e-mail notification when new information is available in [address] 9. Click Sign Up. You can now view and download portions of your medical record. 10. Click the Download Summary menu link to download a portable copy of your medical information.  
 
If you have questions, please visit the Frequently Asked Questions section of the [employer]. Remember, [employer]and[employer] is NOT to be used for urgent needs. For medical emergencies, dial 911. Now available from your iPhone and Android! Please provide this summary of care documentation to your next provider. Your primary care clinician is listed as Bladimir Henderson. If you have any questions after today's visit, please call [phone].

## 2018-02-02 NOTE — PATIENT INSTRUCTIONS
4 Ways to Lose Weight Without Counting Calories:  (from the [address] [place] [address] channel)  Https://youZoomForthu. be/iOp8snhTVaG  1. Reduce carbohydrate intake  2. Use smaller plates  3. Replace high-carb breakfast with eggs and veggies  4. Get good sleep  (don't forget to watch the video!)      7 Weight Loss Tips That Are Truly Science-Based   (from the [place] [address])  https://youtu. be/BkMhSGgfw8b      5 Simple Steps to Boost Metabolism  (from the [address] [place] [address] channel)  https://youtu. IG/GJ54GSD804A  1. Eat protein at every meal  2. Lift heavy things  3. High intensity workouts  4. Drink green tea  5. Get a good night's sleep    The importance of sleep for weight loss:  http://Juvar[employer]The[employer]/Geosophic/engineering-the-alpha-excerpt    Protein at breakfast:  http://Contract Cloud. [employer]/protein-at-breakfast-and-weight-loss/    How Eating More Protein Helps You Lose Weight  https://IntelligentEco.comu. be/i3uCTa8Wndo    150 minutes per week of aerobic exercise (running, swimming, biking, etc.) is recommended per week, as well as 2-3 days of resistance training that works all the major muscle groups (legs, back, chest, arms). American Diabetes Association (ADA) recommendations for physical activity:   > At least 150 total minutes of moderate-intensity aerobic activity per week.  > No more than 2 consecutive days without physical activity.  > Reduce total sedentary time by incorporating physical activity every 90 minutes. > Resistance exercise using all eight major muscle groups 2+ times per week. If you don't have access to a gym or any fitness equipment at home, there are still plenty of ways to stay in shape.  Check out this web site for some ideas:   http://Juvaris [employer]Therapeutics/fitness/cardio-bodyweight-exercises

## 2018-02-02 NOTE — PROGRESS NOTES
HISTORY OF PRESENT ILLNESS  Lis Mares is a 63 y.o. female. HPI Comments: Pt presents for follow up. She was referred to neurology, but hasn't seen anyone there yet. It sounds like there may have been some neurology-urology confusion, as she indicates that she went to an appointment and was told that she had gone to the wrong place. She would like to be seen somewhere in [place], if possible. She was also referred to urology, where she was put on lasix for nocturia. States the nocturia has improved: she now gets up seldom or not at all in the middle of the night. She also notes significant improvement in the size of her legs (swelling). She notes a history of dyslipidemia, so I reviewed some labs from a clinic in [place] that were dated for March of 2017. Her HDL is 59, LDL = 115, everything else looks good. She seems to spend a lot of time around the house lately, and knows she needs to get more exercise. She is looking forward to the weather getting better so she can get out more. She generally likes lots of veggies, fruits, seafood, and pistachios. Other   Pertinent negatives include no chest pain, no headaches and no shortness of breath. Review of Systems   Constitutional: Negative for chills, fever and malaise/fatigue. Eyes: Negative for blurred vision and double vision. Respiratory: Negative for cough and shortness of breath. Cardiovascular: Negative for chest pain, palpitations and orthopnea. Gastrointestinal: Negative for nausea and vomiting. Genitourinary: Negative for dysuria and frequency. Neurological: Negative for dizziness, tingling, weakness and headaches. Psychiatric/Behavioral: Negative for depression. The patient is not nervous/anxious and does not have insomnia.       Visit Vitals    BP 97/74 (BP 1 Location: Right arm, BP Patient Position: Sitting)    Pulse 72    Temp 97.2 °F (36.2 °C) (Oral)    Resp 18    Ht 5' 4\" (1.626 m)    Wt 178 lb (80.7 kg)    SpO2 96%    BMI 30.55 kg/m2       Physical Exam   Constitutional: She is oriented to person, place, and time. Vital signs are normal. She appears well-developed and well-nourished. She is cooperative. She does not appear ill. No distress. HENT:   Head: Normocephalic and atraumatic. Right Ear: External ear normal.   Left Ear: External ear normal.   Nose: Nose normal. No nasal deformity. Eyes: Conjunctivae are normal.   Neck: Neck supple. Cardiovascular: Normal rate, regular rhythm and normal heart sounds. Exam reveals no gallop and no friction rub. No murmur heard. Pulses:       Radial pulses are 2+ on the right side, and 2+ on the left side. Pulmonary/Chest: Effort normal and breath sounds normal. She has no decreased breath sounds. She has no wheezes. She has no rhonchi. She has no rales. Lymphadenopathy:     She has no cervical adenopathy. Neurological: She is alert and oriented to person, place, and time. Skin: Skin is warm and dry. Psychiatric: She has a normal mood and affect. Her speech is normal and behavior is normal. Thought content normal.   Nursing note and vitals reviewed. ASSESSMENT and PLAN    ICD-10-CM ICD-9-CM    1. Intracranial shunt Z98.2 V45.2 REFERRAL TO NEUROLOGY   2. History of aneurysm Z86.79 V12.59 REFERRAL TO NEUROLOGY   3. High risk medication use N03.168 D94.85 METABOLIC PANEL, BASIC   4. Obesity (BMI 30-39. 9) E66.9 278.00      1,2. Referred to neurology for monitoring of shunt. Will try to get her in with [facility], but advised pt we may need to renew the referral to [facility] if that falls through. 3. Checking electrolyte levels since pt is taking lasix and potassium daily. 4. Provided activity goals per the ADA and recommended some videos from [employer] for pt to watch on WhoJam. Follow up in a couple of months. We'll also plan to recheck lipids once she verifies her insurance (in a couple of months).     Pt verbalized understanding and agreement with the plan of care.     Elizabeth Garcia PA-C

## 2018-02-06 ENCOUNTER — TELEPHONE (OUTPATIENT)
Dept: FAMILY MEDICINE CLINIC | Age: 63
End: 2018-02-06

## 2018-02-06 DIAGNOSIS — N18.30 STAGE 3 CHRONIC KIDNEY DISEASE (HCC): Primary | ICD-10-CM

## 2018-02-06 NOTE — TELEPHONE ENCOUNTER
Please let pt know that I have reviewed her labs, and her electrolytes (potassium, sodium, calcium) look good. However, her kidney function is reduced, and appears to have been that way for some time (at least from 2016). So, I have referred her to a nephrologist (which is a kidney specialist, but different from a urologist). I have sent the referral to [facility] in [place] ([phone]) for further work-up and recommendations. Also, we received some paperwork for [employer] requesting that power be maintained to her home based on a serious medical condition. Please ask her to what equipment she needs to maintain electrical power.

## 2018-02-06 NOTE — PROGRESS NOTES
Checked BMP to confirm normal electrolytes relative to lasix/potassium therapy. These look good, however pt appears to be in stage 3 chronic kidney disease--> referred to Johanna De La Fuente at [facility] for further workup and recommendations.

## 2018-02-15 DIAGNOSIS — F32.A DEPRESSION, UNSPECIFIED DEPRESSION TYPE: ICD-10-CM

## 2018-02-15 RX ORDER — PAROXETINE HYDROCHLORIDE 20 MG/1
TABLET, FILM COATED ORAL
Qty: 30 TAB | Refills: 3 | Status: SHIPPED | OUTPATIENT
Start: 2018-02-15 | End: 2018-06-22 | Stop reason: SDUPTHER

## 2018-03-03 DIAGNOSIS — I10 ESSENTIAL HYPERTENSION: ICD-10-CM

## 2018-03-05 RX ORDER — LABETALOL 200 MG/1
TABLET, FILM COATED ORAL
Qty: 60 TAB | Refills: 3 | Status: SHIPPED | OUTPATIENT
Start: 2018-03-05 | End: 2018-07-26 | Stop reason: SDUPTHER

## 2018-03-05 RX ORDER — AMLODIPINE BESYLATE 10 MG/1
TABLET ORAL
Qty: 30 TAB | Refills: 3 | Status: SHIPPED | OUTPATIENT
Start: 2018-03-05 | End: 2018-06-22 | Stop reason: SDUPTHER

## 2018-04-03 ENCOUNTER — HOSPITAL ENCOUNTER (OUTPATIENT)
Dept: LAB | Age: 63
Discharge: HOME | End: 2018-04-03
Payer: SELF-PAY

## 2018-04-03 ENCOUNTER — OFFICE VISIT (OUTPATIENT)
Dept: FAMILY MEDICINE CLINIC | Age: 63
End: 2018-04-03

## 2018-04-03 VITALS
WEIGHT: 185 LBS | HEIGHT: 64 IN | TEMPERATURE: 98.1 F | OXYGEN SATURATION: 97 % | RESPIRATION RATE: 16 BRPM | BODY MASS INDEX: 31.58 KG/M2 | DIASTOLIC BLOOD PRESSURE: 84 MMHG | HEART RATE: 82 BPM | SYSTOLIC BLOOD PRESSURE: 118 MMHG

## 2018-04-03 DIAGNOSIS — Z13.220 ENCOUNTER FOR LIPID SCREENING FOR CARDIOVASCULAR DISEASE: ICD-10-CM

## 2018-04-03 DIAGNOSIS — R60.9 PERIPHERAL EDEMA: ICD-10-CM

## 2018-04-03 DIAGNOSIS — Z13.6 ENCOUNTER FOR LIPID SCREENING FOR CARDIOVASCULAR DISEASE: ICD-10-CM

## 2018-04-03 DIAGNOSIS — I10 HYPERTENSION, UNSPECIFIED TYPE: Primary | ICD-10-CM

## 2018-04-03 DIAGNOSIS — E66.9 OBESITY (BMI 30-39.9): ICD-10-CM

## 2018-04-03 DIAGNOSIS — G47.00 INSOMNIA, UNSPECIFIED TYPE: ICD-10-CM

## 2018-04-03 LAB
CHOLEST SERPL-MCNC: 197 MG/DL
HDLC SERPL-MCNC: 64 MG/DL (ref 40–60)
HDLC SERPL: 3.1 {RATIO} (ref 0–5)
LDLC SERPL CALC-MCNC: 116 MG/DL (ref 0–100)
LIPID PROFILE,FLP: ABNORMAL
TRIGL SERPL-MCNC: 85 MG/DL (ref ?–150)
VLDLC SERPL CALC-MCNC: 17 MG/DL

## 2018-04-03 PROCEDURE — 80061 LIPID PANEL: CPT | Performed by: PHYSICIAN ASSISTANT

## 2018-04-03 NOTE — PATIENT INSTRUCTIONS
You have been referred to:  Mehdi Diego (neurology)--please call his office to make an appointment, or let us know if you'd like to be referred elsewhere. [address] [place] [address]  Phone: [phone]  Fax: [phone]    You have been referred to: Rylan Baez (nephrology)--please call to schedule an appointment. Fabián Nickerson  Phone: [phone]  Fax: [phone]      Consider a supplement of grape seed extract for your leg swelling:     Grape seed's safety record in pregnant and nursing women is unknown, so you should avoid grape seed if you are pregnant or nursing. You should avoid grape seed if you have a bleeding disorder or take coumadin, aspirin, NSAIDS because it may increase the risk of bleeding. Grape seed should be discontinued two weeks prior to any planned surgery, whether dental or otherwise. Please do not consider taking it if you are allergic to grapes, because, like the grapes from which it is derived, it has the potential to cause a severe allergic reaction. DOSAGE: For chronic venous insufficiency (leg swelling or hemorrhoids), take grape seed extract in a dose of 150-300 mg per day. https://"[employer]/8-tips-for-beating-insomnia-and-improving-your-sleep/    http://mancini.Mappyfriends/. com/Pages/Claudia.htm    Check out this video from [employer]:  Https://youtu. be/2kwe875k6oB  \"5 Foods to Help You Sleep\"                  Learning About Sleeping Well  What does sleeping well mean? Sleeping well means getting enough sleep. How much sleep is enough varies among people. The number of hours you sleep is not as important as how you feel when you wake up. If you do not feel refreshed, you probably need more sleep. Another sign of not getting enough sleep is feeling tired during the day. The average total nightly sleep time is 7½ to 8 hours.  Healthy adults may need a little more or a little less than this. Why is getting enough sleep important? Getting enough quality sleep is a basic part of good health. When your sleep suffers, your mood and your thoughts can suffer too. You may find yourself feeling more grumpy or stressed. Not getting enough sleep also can lead to serious problems, including injury, accidents, anxiety, and depression. What might cause poor sleeping? Many things can cause sleep problems, including:  · Stress. Stress can be caused by fear about a single event, such as giving a speech. Or you may have ongoing stress, such as worry about work or school. · Depression, anxiety, and other mental or emotional conditions. · Changes in your sleep habits or surroundings. This includes changes that happen where you sleep, such as noise, light, or sleeping in a different bed. It also includes changes in your sleep pattern, such as having jet lag or working a late shift. · Health problems, such as pain, breathing problems, and restless legs syndrome. · Lack of regular exercise. How can you help yourself? Here are some tips that may help you sleep more soundly and wake up feeling more refreshed. Your sleeping area  · Use your bedroom only for sleeping and sex. A bit of light reading may help you fall asleep. But if it doesn't, do your reading elsewhere in the house. Don't watch TV in bed. · Be sure your bed is big enough to stretch out comfortably, especially if you have a sleep partner. · Keep your bedroom quiet, dark, and cool. Use curtains, blinds, or a sleep mask to block out light. To block out noise, use earplugs, soothing music, or a \"white noise\" machine. Your evening and bedtime routine  · Create a relaxing bedtime routine. You might want to take a warm shower or bath, listen to soothing music, or drink a cup of noncaffeinated tea. · Go to bed at the same time every night. And get up at the same time every morning, even if you feel tired.   What to avoid  · Limit caffeine (coffee, tea, caffeinated sodas) during the day, and don't have any for at least 4 to 6 hours before bedtime. · Don't drink alcohol before bedtime. Alcohol can cause you to wake up more often during the night. · Don't smoke or use tobacco, especially in the evening. Nicotine can keep you awake. · Don't take naps during the day, especially close to bedtime. · Don't lie in bed awake for too long. If you can't fall asleep, or if you wake up in the middle of the night and can't get back to sleep within 15 minutes or so, get out of bed and go to another room until you feel sleepy. · Don't take medicine right before bed that may keep you awake or make you feel hyper or energized. Your doctor can tell you if your medicine may do this and if you can take it earlier in the day. If you can't sleep  · Imagine yourself in a peaceful, pleasant scene. Focus on the details and feelings of being in a place that is relaxing. · Get up and do a quiet or boring activity until you feel sleepy. · Don't drink any liquids after 6 p.m. if you wake up often because you have to go to the bathroom. Where can you learn more? Go to http://hansa-jarrod.info/. Enter N033 in the search box to learn more about \"Learning About Sleeping Well. \"  Current as of: May 12, 2017  Content Version: 11.4  © 7976-4922 Healthwise, [facility]. Care instructions adapted under license by [employer] (which disclaims liability or warranty for this information). If you have questions about a medical condition or this instruction, always ask your healthcare professional. [place] [address] any warranty or liability for your use of this information. Meditation has been shown in scientific studies to have a whole host of benefits from reductions in anxiety, blood pressure, and hemoglobin A1C to improvements in concentration, sleep, and general well-being.      Check out this article from Alberto Booker on the benefits of regular meditation and mindfulness practices: https://Shobutt Babies/pmiwhsvywcm-tjpuekpa-navsee-mindfulness-can-help/    Here's a quick (10 minute) mindfulness exercise:   https://Marqeta. be/0Y8YspTSMRs  \"Mindfulness Breathing Exercise\"  (on YouTube)    Try these quick mindfulness exercise. Setting aside a few minutes every day can help with stress and focus,  Or you can do it when you're feeling stressed:     https://Marqeta. Fast Asset/gSgIOEGiU4i  It can be found on YouTube under   \"5-Minute Mindful Breathing Meditation\"  Stop, Breathe & Think    IRSCoupons.no  \"Mindfulness Guided Meditation - 5 Minutes\"  by Dr. Omega Mercedes    https://Marqeta. Fast Asset/9W2ZtkPGWJk  10 minute Mindfulness Breathing Exercise    https://articles. Tangentix.ARPU/sites/articles/archive/2016/02/15/foods-for-kidney-health. aspx    4 Ways to Lose Weight Without Counting Calories:  (from the [address] [place] [address])  Https://Marqeta. be/gRf2oenQXdP  1. Reduce carbohydrate intake  2. Use smaller plates  3. Replace high-carb breakfast with eggs and veggies  4. Get good sleep  (don't forget to watch the video!)      7 Weight Loss Tips That Are Truly Science-Based   (from the [place] [address])  https://Marqeta. be/StVwNNfeh9g      5 Simple Steps to Boost Metabolism  (from the [address] [place] [address] channel)  https://Marqeta. /HR79QTF847E  1. Eat protein at every meal  2. Lift heavy things  3. High intensity workouts  4. Drink green tea  5. Get a good night's sleep    The importance of sleep for weight loss:  http://Saunders Solutions.ARPU/health/engineering-the-alpha-excerpt    Protein at breakfast:  http://authorityDacuda. com/protein-at-breakfast-and-weight-loss/    How Eating More Protein Helps You Lose Weight  https://Marqeta. be/d7kSWn6Bebf

## 2018-04-03 NOTE — MR AVS SNAPSHOT
Elvin Medina [address] [facility] [address] [address] 
[phone] Patient: Giselle Hodge MRN: [id number] Visit Information Date & Time Provider Department Dept. Phone Encounter #  
 4/3/2018  2:00 PM Rafa Fofana PA-C [place] [phone] [id number] Follow-up Instructions Return in about 3 months (around 7/3/2018). Upcoming Health Maintenance Date Due Hepatitis C Screening 1955 DTaP/Tdap/Td series (1 - Tdap) 2/4/1978 PAP AKA CERVICAL CYTOLOGY 2/4/1978 FOBT Q 1 YEAR AGE 50-75 2/4/2007 ZOSTER VACCINE AGE 60> 12/4/2016 Influenza Age 5 to Adult 8/1/2017 MEDICARE YEARLY EXAM 3/14/2018 BREAST CANCER SCRN MAMMOGRAM 10/31/2019 Allergies as of 4/3/2018  Review Complete On: 4/3/2018 By: Rafa Fofana PA-C Severity Noted Reaction Type Reactions Mushroom  10/06/2017    Swelling Current Immunizations  Never Reviewed No immunizations on file. Not reviewed this visit You Were Diagnosed With   
  
 Codes Comments Hypertension, unspecified type    -  Primary ICD-10-CM: I10 
ICD-9-CM: 401.9 Peripheral edema     ICD-10-CM: R60.9 ICD-9-CM: 782.3 Encounter for lipid screening for cardiovascular disease     ICD-10-CM: Z13.220, Z13.6 ICD-9-CM: V77.91, V81.2 Vitals BP Pulse Temp Resp Height(growth percentile) Weight(growth percentile) 118/84 (BP 1 Location: Left arm, BP Patient Position: Sitting) 82 98.1 °F (36.7 °C) (Oral) 16 5' 4\" (1.626 m) 185 lb (83.9 kg) SpO2 BMI OB Status Smoking Status 97% 31.76 kg/m2 Hysterectomy Former Smoker Vitals History BMI and BSA Data Body Mass Index Body Surface Area 31.76 kg/m 2 1.95 m 2 Preferred Pharmacy Pharmacy Name Phone [facility], [address] [place] [address] Your Updated Medication List  
  
   
 This list is accurate as of 4/3/18  2:46 PM.  Always use your most recent med list. amLODIPine 10 mg tablet Commonly known as:  NORVASC  
take 1 tablet by mouth once daily  
  
 furosemide 20 mg tablet Commonly known as:  LASIX One tab q d 6pm  
  
 labetalol 200 mg tablet Commonly known as:  James Heredia  
take 1 tablet by mouth twice a day  
  
 ondansetron hcl 4 mg tablet Commonly known as:  ZOFRAN (AS HYDROCHLORIDE) Take 2 Tabs by mouth every eight (8) hours as needed for Nausea. PARoxetine 20 mg tablet Commonly known as:  PAXIL  
take 1 tablet by mouth once daily  
  
 potassium chloride 10 mEq tablet Commonly known as:  KLOR-CON One tab q d 6 pm  
  
  
  
  
Follow-up Instructions Return in about 3 months (around 7/3/2018). To-Do List   
 04/03/2018 Lab:  LIPID PANEL Patient Instructions You have been referred to: 
Sharon Ace (neurology)--please call his office to make an appointment, or let us know if you'd like to be referred elsewhere. [address] [address] Phone: [phone] Fax: [phone] You have been referred to: Rylan Baez (nephrology)--please call to schedule an appointment. [address] [place] [address] Phone: [phone] Fax: [phone] Consider a supplement of grape seed extract for your leg swelling:  
 
Grape seed's safety record in pregnant and nursing women is unknown, so you should avoid grape seed if you are pregnant or nursing. You should avoid grape seed if you have a bleeding disorder or take coumadin, aspirin, NSAIDS because it may increase the risk of bleeding. Grape seed should be discontinued two weeks prior to any planned surgery, whether dental or otherwise. Please do not consider taking it if you are allergic to grapes, because, like the grapes from which it is derived, it has the potential to cause a severe allergic reaction. DOSAGE: For chronic venous insufficiency (leg swelling or hemorrhoids), take grape seed extract in a dose of 150-300 mg per day. https://Maven7/8-tips-for-beating-insomnia-and-improving-your-sleep/ 
 
http://mancini.MineralRightsWorldwide.com. com/Pages/Claudia.htm Check out this video from [employer] Nutrition: 
Https://youtu. be/7jyb284v9mQ \"5 Foods to Help You Sleep\" Learning About Sleeping Well What does sleeping well mean? Sleeping well means getting enough sleep. How much sleep is enough varies among people. The number of hours you sleep is not as important as how you feel when you wake up. If you do not feel refreshed, you probably need more sleep. Another sign of not getting enough sleep is feeling tired during the day. The average total nightly sleep time is 7½ to 8 hours. Healthy adults may need a little more or a little less than this. Why is getting enough sleep important? Getting enough quality sleep is a basic part of good health. When your sleep suffers, your mood and your thoughts can suffer too. You may find yourself feeling more grumpy or stressed. Not getting enough sleep also can lead to serious problems, including injury, accidents, anxiety, and depression. What might cause poor sleeping? Many things can cause sleep problems, including: · Stress. Stress can be caused by fear about a single event, such as giving a speech. Or you may have ongoing stress, such as worry about work or school. · Depression, anxiety, and other mental or emotional conditions. · Changes in your sleep habits or surroundings. This includes changes that happen where you sleep, such as noise, light, or sleeping in a different bed. It also includes changes in your sleep pattern, such as having jet lag or working a late shift. · Health problems, such as pain, breathing problems, and restless legs syndrome. · Lack of regular exercise. How can you help yourself? Here are some tips that may help you sleep more soundly and wake up feeling more refreshed. Your sleeping area · Use your bedroom only for sleeping and sex. A bit of light reading may help you fall asleep. But if it doesn't, do your reading elsewhere in the house. Don't watch TV in bed. · Be sure your bed is big enough to stretch out comfortably, especially if you have a sleep partner. · Keep your bedroom quiet, dark, and cool. Use curtains, blinds, or a sleep mask to block out light. To block out noise, use earplugs, soothing music, or a \"white noise\" machine. Your evening and bedtime routine · Create a relaxing bedtime routine. You might want to take a warm shower or bath, listen to soothing music, or drink a cup of noncaffeinated tea. · Go to bed at the same time every night. And get up at the same time every morning, even if you feel tired. What to avoid · Limit caffeine (coffee, tea, caffeinated sodas) during the day, and don't have any for at least 4 to 6 hours before bedtime. · Don't drink alcohol before bedtime. Alcohol can cause you to wake up more often during the night. · Don't smoke or use tobacco, especially in the evening. Nicotine can keep you awake. · Don't take naps during the day, especially close to bedtime. · Don't lie in bed awake for too long. If you can't fall asleep, or if you wake up in the middle of the night and can't get back to sleep within 15 minutes or so, get out of bed and go to another room until you feel sleepy. · Don't take medicine right before bed that may keep you awake or make you feel hyper or energized. Your doctor can tell you if your medicine may do this and if you can take it earlier in the day. If you can't sleep · Imagine yourself in a peaceful, pleasant scene. Focus on the details and feelings of being in a place that is relaxing. · Get up and do a quiet or boring activity until you feel sleepy. · Don't drink any liquids after 6 p.m. if you wake up often because you have to go to the bathroom. Where can you learn more? Go to http://hansa-jarrod.info/. Enter K873 in the search box to learn more about \"Learning About Sleeping Well. \" Current as of: May 12, 2017 Content Version: 11.4 © 7789-0524 [employer]. Care instructions adapted under license by [employer] (which disclaims liability or warranty for this information). If you have questions about a medical condition or this instruction, always ask your healthcare professional. [place] [address] any warranty or liability for your use of this information. Meditation has been shown in scientific studies to have a whole host of benefits from reductions in anxiety, blood pressure, and hemoglobin A1C to improvements in concentration, sleep, and general well-being. Check out this article from [employer] on the benefits of regular meditation and mindfulness practices: https://Enure Networks/chfqstoejcj-bmhhnrdf-kwepic-mindfulness-can-help/ 
 
Here's a quick (10 minute) mindfulness exercise:  
https://Hug Energy. Cargomatic/1A8KeuBCJCb \"Mindfulness Breathing Exercise\" (on YouTube) Try these quick mindfulness exercise. Setting aside a few minutes every day can help with stress and focus, Or you can do it when you're feeling stressed:  
 
https://Hug Energy. Cargomatic/dVsNLEWmH2w It can be found on CommercialTribeube under \"5-Minute Mindful Breathing Meditation\" Stop, Breathe & Think 
 
IRSCoupons.no \"Mindfulness Guided Meditation - 5 Minutes\" 
by Dr. Manoj Salcedo. Daren 
 
https://Hug Energy. Cargomatic/2E0RdyLFIEg 10 minute Mindfulness Breathing Exercise 
 
https://articles. Peak Rx #2.com/sites/articles/archive/2016/02/15/foods-for-kidney-health. aspx 4 Ways to Lose Weight Without Counting Calories: 
(from the [address] [place] [address] channel) Https://Tears for Lifeu. be/oQv3cydZAmH 
 1. Reduce carbohydrate intake 2. Use smaller plates 3. Replace high-carb breakfast with eggs and veggies 4. Get good sleep 
(don't forget to watch the video!) 7 Weight Loss Tips That Are Truly Science-Based (from the [facility]) 
https://youtu. be/CrInNNewn6v 
 
 
5 Simple Steps to Boost Metabolism (from the [address] [place] [address] channel) 
https://youtu. /NH17ZZH966I 1. Eat protein at every meal 
2. Lift heavy things 3. High intensity workouts 4. Drink green tea 5. Get a good night's sleep The importance of sleep for weight loss: 
http://Exclusively.in/health/engineering-the-alpha-excerpt Protein at breakfast: 
http://Douban. Fulc[employer]/protein-at-breakfast-and-weight-loss/ How Eating More Protein Helps You Lose Weight 
https://Hansen Medicalu. be/y2hQAc5Zdik Introducing [facility]! [employer] introduces meevl patient portal. Now you can access parts of your medical record, email your doctor's office, and request medication refills online. 1. In your internet browser, go to [email or web address] 2. Click on the First Time User? Click Here link in the Sign In box. You will see the New Member Sign Up page. 3. Enter your meevl Access Code exactly as it appears below. You will not need to use this code after youve completed the sign-up process. If you do not sign up before the expiration date, you must request a new code. · meevl Access Code: [email or web address] Expires: 5/3/2018 12:03 PM 
 
4. Enter the last four digits of your Social Security Number (xxxx) and Date of Birth (mm/dd/yyyy) as indicated and click Submit. You will be taken to the next sign-up page. 5. Create a Shoulder Tapt ID. This will be your Shoulder Tapt login ID and cannot be changed, so think of one that is secure and easy to remember. 6. Create a Shoulder Tapt password. You can change your password at any time. 7. Enter your Password Reset Question and Answer. This can be used at a later time if you forget your password. 8. Enter your e-mail address. You will receive e-mail notification when new information is available in [address] 9. Click Sign Up. You can now view and download portions of your medical record. 10. Click the Download Summary menu link to download a portable copy of your medical information. If you have questions, please visit the Frequently Asked Questions section of the RawData website. Remember, RawData is NOT to be used for urgent needs. For medical emergencies, dial 911. Now available from your iPhone and Android! Please provide this summary of care documentation to your next provider. Your primary care clinician is listed as Bladimir Henderson. If you have any questions after today's visit, please call [phone].

## 2018-04-03 NOTE — PROGRESS NOTES
HISTORY OF PRESENT ILLNESS  Neftali Machado is a 66 y.o. female. HPI Comments: Pt presents for follow up of hypertension and leg swelling. States she forgot to take her blood pressure medication this morning. She is still experiencing some swelling in the legs, that improves when she lays down at night, and with use of lasix (every evening at 6). Pt indicates she is fasting for her lipid screening today. States she likes all her vegetables, and has tried to stop eating white bread at the advice of her last doctor, but expresses that it is difficult. She has been helping out with family, and is not always able to eat the way she knows she needs to. She indicates that she intends to get out more as the weather improves. Pt states she has not yet heard from neurology, and seems unaware that she has low kidney function (so has not heard from nephrology either). She admits to some trouble falling asleep, often thinking (but not worrying per se) about her finances and other things in her life. She has no regular bedtime yet. Leg Swelling   Pertinent negatives include no chest pain, no headaches and no shortness of breath. Review of Systems   Respiratory: Negative for shortness of breath. Cardiovascular: Positive for leg swelling. Negative for chest pain, palpitations and claudication. Genitourinary:        No nocturia   Neurological: Negative for dizziness and headaches. Endo/Heme/Allergies: Does not bruise/bleed easily. Psychiatric/Behavioral: Positive for depression (currently managed with paxil). Negative for suicidal ideas. The patient has insomnia. Visit Vitals    /84 (BP 1 Location: Left arm, BP Patient Position: Sitting)  Comment: left arm, maroon cuff    Pulse 82    Temp 98.1 °F (36.7 °C) (Oral)    Resp 16    Ht 5' 4\" (1.626 m)    Wt 185 lb (83.9 kg)    SpO2 97%    BMI 31.76 kg/m2       Physical Exam   Constitutional: She is oriented to person, place, and time. Vital signs are normal. She appears well-developed and well-nourished. She is cooperative. She does not appear ill. No distress. HENT:   Head: Normocephalic and atraumatic. Right Ear: External ear normal.   Left Ear: External ear normal.   Nose: Nose normal. No mucosal edema or rhinorrhea. Eyes: Conjunctivae are normal.   Neck: Neck supple. Cardiovascular: Normal rate, regular rhythm and normal heart sounds. Exam reveals no gallop and no friction rub. No murmur heard. Pulses:       Radial pulses are 2+ on the right side, and 2+ on the left side. Posterior tibial pulses are 2+ on the right side, and 2+ on the left side. Pulmonary/Chest: Effort normal and breath sounds normal. She has no decreased breath sounds. She has no wheezes. She has no rhonchi. She has no rales. Musculoskeletal:   Mild edema right ankle/pretibial area. 1+ pitting edema left ankle/lower leg   Lymphadenopathy:     She has no cervical adenopathy. Neurological: She is alert and oriented to person, place, and time. Skin: Skin is warm and dry. Psychiatric: She has a normal mood and affect. Her speech is normal and behavior is normal. Thought content normal.   Nursing note and vitals reviewed. ASSESSMENT and PLAN    ICD-10-CM ICD-9-CM    1. Hypertension, unspecified type I10 401.9    2. Peripheral edema R60.9 782.3    3. Encounter for lipid screening for cardiovascular disease Z13.220 V77.91 LIPID PANEL    Z13.6 V81.2    4. Insomnia, unspecified type G47.00 780.52    5. Obesity (BMI 30-39. 9) E66.9 278.00      1. Well-controlled today (despite not taking medication). Possibly due partially to nighttime diuretic? 2. Well-controlled with lasix therapy. Discussed grape seed extract supplement to improve venous tone. 3. Will follow labs and advise. 4. Possibly d/t poor sleep hygiene.  Discussed importance of regular bedtime, d/c electronics use before bed, avoiding artificial light at night, seeking natural light in the morning. Provided sleep health handout and link to article from Ramin Jones. 5. Provided links to articles & videos from [address]. Recommended minimizing intake of sugar and bread products--eating lots of vegetables instead. Plan to discuss further at follow-up. Pt verbalized understanding and agreement with the plan of care.     Sammi Jack PA-C

## 2018-04-03 NOTE — PROGRESS NOTES
Rm 8  Pt presents to the clinic for the follow-up regarding her left leg swelling. Pt said she took the Lasix and the swelling went down but it has started swelling again. Flu Shot Requested: no    Depression Screening:  PHQ over the last two weeks 2/2/2018 10/6/2017   Little interest or pleasure in doing things Not at all Not at all   Feeling down, depressed or hopeless Not at all Not at all   Total Score PHQ 2 0 0       Learning Assessment:  Learning Assessment 9/26/2017   PRIMARY LEARNER Patient   HIGHEST LEVEL OF EDUCATION - PRIMARY LEARNER  DID NOT GRADUATE HIGH SCHOOL   PRIMARY LANGUAGE ENGLISH   LEARNER PREFERENCE PRIMARY DEMONSTRATION   ANSWERED BY patient   RELATIONSHIP SELF       Abuse Screening:  No flowsheet data found. Health Maintenance reviewed and discussed per provider: yes     Coordination of Care:    1. Have you been to the ER, urgent care clinic since your last visit? Hospitalized since your last visit? no    2. Have you seen or consulted any other health care providers outside of the [address] [place] [address] since your last visit? Include any pap smears or colon screening.  no

## 2018-04-04 NOTE — PROGRESS NOTES
Please let pt know her lipid panel looks good. She should continue to work on eating plenty of whole foods--mostly plants, and continue to increase physical activity and exercise.

## 2018-04-04 NOTE — TELEPHONE ENCOUNTER
Pt was seen in the office yesterday. She was advised of her diminished kidney function and advised to call the nephrologists office for an appointment. She did not mention the paperwork from [employer].

## 2018-04-05 NOTE — PROGRESS NOTES
Called pt to inform her of her results. Pt did not answer and no option to leave a message was available.

## 2018-04-10 ENCOUNTER — TELEPHONE (OUTPATIENT)
Dept: FAMILY MEDICINE CLINIC | Age: 63
End: 2018-04-10

## 2018-04-10 NOTE — TELEPHONE ENCOUNTER
Pt returned call. Pt states that she is required to have a Cpap machine and that she had on in the past but need a new one. Pt was advised that she needed to see Neurology ( which she has already been referred to) to have [address] form filled out stating that she is required to have this machine.  Pt verbalized understanding and had no further questions

## 2018-05-08 ENCOUNTER — TELEPHONE (OUTPATIENT)
Dept: FAMILY MEDICINE CLINIC | Age: 63
End: 2018-05-08

## 2018-05-08 DIAGNOSIS — R92.0 MICROCALCIFICATION OF RIGHT BREAST ON MAMMOGRAPHY: Primary | ICD-10-CM

## 2018-05-23 NOTE — TELEPHONE ENCOUNTER
Pt returned phone call. Pt was advised of lab results from 4/3 as well as the need for repeat mammo. I advised pt that Taryn Cooley would put the order in and she would receive a call from the scheduling center.  Pt verbalized understanding and had no further questions

## 2018-06-05 ENCOUNTER — FACE TO FACE ENCOUNTER (OUTPATIENT)
Dept: FAMILY MEDICINE CLINIC | Age: 63
End: 2018-06-05

## 2018-06-05 DIAGNOSIS — G47.33 OBSTRUCTIVE SLEEP APNEA: Primary | ICD-10-CM

## 2018-06-05 NOTE — PROGRESS NOTES
Pt dropped by with her fiance, Esther Shelton, to complete a serious medical condition certification form. States she has sleep apnea, and has had since she lived in [place]. She reports that she received a CPAP from her physician in [place], but has had some trouble with it and would like to be referred to our sleep center for further evaluation and treatment recommendations.

## 2018-06-07 ENCOUNTER — HOSPITAL ENCOUNTER (OUTPATIENT)
Dept: MAMMOGRAPHY | Age: 63
Discharge: HOME | End: 2018-06-07
Attending: FAMILY MEDICINE

## 2018-06-07 DIAGNOSIS — R92.0 MICROCALCIFICATION OF RIGHT BREAST ON MAMMOGRAPHY: ICD-10-CM

## 2018-06-22 DIAGNOSIS — I10 ESSENTIAL HYPERTENSION: ICD-10-CM

## 2018-06-22 DIAGNOSIS — F32.A DEPRESSION, UNSPECIFIED DEPRESSION TYPE: ICD-10-CM

## 2018-06-22 RX ORDER — PAROXETINE HYDROCHLORIDE 20 MG/1
TABLET, FILM COATED ORAL
Qty: 30 TAB | Refills: 3 | Status: SHIPPED | OUTPATIENT
Start: 2018-06-22 | End: 2018-11-01 | Stop reason: SDUPTHER

## 2018-06-22 RX ORDER — AMLODIPINE BESYLATE 10 MG/1
TABLET ORAL
Qty: 30 TAB | Refills: 3 | Status: SHIPPED | OUTPATIENT
Start: 2018-06-22 | End: 2018-11-01 | Stop reason: SDUPTHER

## 2018-07-26 DIAGNOSIS — I10 ESSENTIAL HYPERTENSION: ICD-10-CM

## 2018-07-26 RX ORDER — LABETALOL 200 MG/1
TABLET, FILM COATED ORAL
Qty: 60 TAB | Refills: 3 | Status: SHIPPED | OUTPATIENT
Start: 2018-07-26 | End: 2019-01-18

## 2018-11-20 ENCOUNTER — HOSPITAL ENCOUNTER (OUTPATIENT)
Dept: MAMMOGRAPHY | Age: 63
Discharge: HOME | End: 2018-11-20
Attending: FAMILY MEDICINE
Payer: MEDICARE

## 2018-11-20 DIAGNOSIS — Z12.31 ENCOUNTER FOR SCREENING MAMMOGRAM FOR MALIGNANT NEOPLASM OF BREAST: ICD-10-CM

## 2018-11-20 PROCEDURE — 77063 BREAST TOMOSYNTHESIS BI: CPT

## 2018-12-02 DIAGNOSIS — R35.1 NOCTURIA MORE THAN TWICE PER NIGHT: ICD-10-CM

## 2018-12-03 RX ORDER — FUROSEMIDE 20 MG/1
TABLET ORAL
Qty: 30 TAB | Refills: 11 | Status: SHIPPED | OUTPATIENT
Start: 2018-12-03

## 2018-12-08 DIAGNOSIS — R35.1 NOCTURIA MORE THAN TWICE PER NIGHT: ICD-10-CM

## 2018-12-10 RX ORDER — POTASSIUM CHLORIDE 750 MG/1
TABLET, EXTENDED RELEASE ORAL
Qty: 30 TAB | Refills: 11 | Status: SHIPPED | OUTPATIENT
Start: 2018-12-10

## 2019-01-18 ENCOUNTER — OFFICE VISIT (OUTPATIENT)
Dept: FAMILY MEDICINE CLINIC | Age: 64
End: 2019-01-18

## 2019-01-18 ENCOUNTER — HOSPITAL ENCOUNTER (OUTPATIENT)
Dept: LAB | Age: 64
Discharge: HOME | End: 2019-01-18
Payer: MEDICARE

## 2019-01-18 VITALS
OXYGEN SATURATION: 97 % | BODY MASS INDEX: 29.19 KG/M2 | WEIGHT: 171 LBS | SYSTOLIC BLOOD PRESSURE: 120 MMHG | HEIGHT: 64 IN | DIASTOLIC BLOOD PRESSURE: 96 MMHG | HEART RATE: 89 BPM | TEMPERATURE: 98.1 F | RESPIRATION RATE: 18 BRPM

## 2019-01-18 DIAGNOSIS — I10 ESSENTIAL HYPERTENSION: ICD-10-CM

## 2019-01-18 DIAGNOSIS — Z86.39 HISTORY OF HYPERTHYROIDISM: ICD-10-CM

## 2019-01-18 DIAGNOSIS — F32.A DEPRESSION, UNSPECIFIED DEPRESSION TYPE: ICD-10-CM

## 2019-01-18 DIAGNOSIS — R79.89 ELEVATED SERUM CREATININE: ICD-10-CM

## 2019-01-18 DIAGNOSIS — Z86.79 HISTORY OF INTRACRANIAL ANEURYSM: ICD-10-CM

## 2019-01-18 DIAGNOSIS — Z98.2 INTRACRANIAL SHUNT: ICD-10-CM

## 2019-01-18 DIAGNOSIS — I10 ESSENTIAL HYPERTENSION: Primary | ICD-10-CM

## 2019-01-18 LAB
ALBUMIN SERPL-MCNC: 4 G/DL (ref 3.4–5)
ALBUMIN/GLOB SERPL: 1 {RATIO} (ref 0.8–1.7)
ALP SERPL-CCNC: 98 U/L (ref 45–117)
ALT SERPL-CCNC: 23 U/L (ref 13–56)
ANION GAP SERPL CALC-SCNC: 5 MMOL/L (ref 3–18)
AST SERPL-CCNC: 27 U/L (ref 15–37)
BASOPHILS # BLD: 0 K/UL (ref 0–0.1)
BASOPHILS NFR BLD: 1 % (ref 0–2)
BILIRUB SERPL-MCNC: 1.2 MG/DL (ref 0.2–1)
BILIRUB UR QL STRIP: NEGATIVE
BUN SERPL-MCNC: 21 MG/DL (ref 7–18)
BUN/CREAT SERPL: 16 (ref 12–20)
CALCIUM SERPL-MCNC: 9.3 MG/DL (ref 8.5–10.1)
CHLORIDE SERPL-SCNC: 101 MMOL/L (ref 100–108)
CO2 SERPL-SCNC: 30 MMOL/L (ref 21–32)
CREAT SERPL-MCNC: 1.34 MG/DL (ref 0.6–1.3)
DIFFERENTIAL METHOD BLD: ABNORMAL
EOSINOPHIL # BLD: 0.2 K/UL (ref 0–0.4)
EOSINOPHIL NFR BLD: 2 % (ref 0–5)
ERYTHROCYTE [DISTWIDTH] IN BLOOD BY AUTOMATED COUNT: 13.7 % (ref 11.6–14.5)
GLOBULIN SER CALC-MCNC: 4.1 G/DL (ref 2–4)
GLUCOSE SERPL-MCNC: 94 MG/DL (ref 74–99)
GLUCOSE UR-MCNC: NEGATIVE MG/DL
HCT VFR BLD AUTO: 42.7 % (ref 35–45)
HGB BLD-MCNC: 13.2 G/DL (ref 12–16)
KETONES P FAST UR STRIP-MCNC: NEGATIVE MG/DL
LYMPHOCYTES # BLD: 2.2 K/UL (ref 0.9–3.6)
LYMPHOCYTES NFR BLD: 35 % (ref 21–52)
MCH RBC QN AUTO: 26.3 PG (ref 24–34)
MCHC RBC AUTO-ENTMCNC: 30.9 G/DL (ref 31–37)
MCV RBC AUTO: 85.1 FL (ref 74–97)
MONOCYTES # BLD: 0.6 K/UL (ref 0.05–1.2)
MONOCYTES NFR BLD: 9 % (ref 3–10)
NEUTS SEG # BLD: 3.4 K/UL (ref 1.8–8)
NEUTS SEG NFR BLD: 53 % (ref 40–73)
PH UR STRIP: 5 [PH] (ref 4.6–8)
PLATELET # BLD AUTO: 297 K/UL (ref 135–420)
PMV BLD AUTO: 11.8 FL (ref 9.2–11.8)
POTASSIUM SERPL-SCNC: 3.7 MMOL/L (ref 3.5–5.5)
PROT SERPL-MCNC: 8.1 G/DL (ref 6.4–8.2)
PROT UR QL STRIP: NORMAL
RBC # BLD AUTO: 5.02 M/UL (ref 4.2–5.3)
SODIUM SERPL-SCNC: 136 MMOL/L (ref 136–145)
SP GR UR STRIP: 1.03 (ref 1–1.03)
T4 FREE SERPL-MCNC: 1.1 NG/DL (ref 0.7–1.5)
TSH SERPL DL<=0.05 MIU/L-ACNC: 0.31 UIU/ML (ref 0.36–3.74)
UA UROBILINOGEN AMB POC: NORMAL (ref 0.2–1)
URINALYSIS CLARITY POC: CLEAR
URINALYSIS COLOR POC: NORMAL
URINE BLOOD POC: NEGATIVE
URINE LEUKOCYTES POC: NORMAL
URINE NITRITES POC: NEGATIVE
WBC # BLD AUTO: 6.3 K/UL (ref 4.6–13.2)

## 2019-01-18 PROCEDURE — 84439 ASSAY OF FREE THYROXINE: CPT

## 2019-01-18 PROCEDURE — 85025 COMPLETE CBC W/AUTO DIFF WBC: CPT

## 2019-01-18 PROCEDURE — 80053 COMPREHEN METABOLIC PANEL: CPT

## 2019-01-18 PROCEDURE — 82043 UR ALBUMIN QUANTITATIVE: CPT

## 2019-01-18 RX ORDER — AMLODIPINE BESYLATE 10 MG/1
TABLET ORAL
Qty: 30 TAB | Refills: 1 | Status: CANCELLED | OUTPATIENT
Start: 2019-01-18

## 2019-01-18 RX ORDER — LABETALOL 200 MG/1
TABLET, FILM COATED ORAL
Qty: 60 TAB | Refills: 3 | Status: CANCELLED | OUTPATIENT
Start: 2019-01-18

## 2019-01-18 RX ORDER — PAROXETINE HYDROCHLORIDE 20 MG/1
TABLET, FILM COATED ORAL
Qty: 30 TAB | Refills: 3 | Status: SHIPPED | OUTPATIENT
Start: 2019-01-18 | End: 2019-02-23 | Stop reason: SDUPTHER

## 2019-01-18 RX ORDER — AMLODIPINE BESYLATE 5 MG/1
5 TABLET ORAL DAILY
Qty: 30 TAB | Refills: 1 | Status: SHIPPED | OUTPATIENT
Start: 2019-01-18 | End: 2019-03-30 | Stop reason: SDUPTHER

## 2019-01-18 RX ORDER — LABETALOL 100 MG/1
100 TABLET, FILM COATED ORAL 2 TIMES DAILY
Qty: 60 TAB | Refills: 1 | Status: SHIPPED | OUTPATIENT
Start: 2019-01-18 | End: 2019-04-10 | Stop reason: SDUPTHER

## 2019-01-18 NOTE — PROGRESS NOTES
HISTORY OF PRESENT ILLNESS Jong Warren is a 66 y.o. female. HPI Ms. Jr Vila presents for medication refill. She reports she has been out of Labetalol, Norvasc, and Paxil x 3-4 weeks. 1) HTN - DBP elevated but not severely. She states multiple times she has been out of both meds for ~3 weeks. 2) Hx of intracranial shunt, hx of brain aneurysm - we have referred her twice to neurology. She states the first referral in [place] was too far away. She is unsure why she did not see neurology with the 2nd referral.  
 
3) Depression - well-controlled with Paxil. She desires to continue this. Review of Systems Respiratory: Negative for shortness of breath. Cardiovascular: Negative for chest pain and leg swelling. Neurological: Negative for dizziness and headaches. /87 (BP 1 Location: Right arm, BP Patient Position: Sitting)   Pulse 89   Temp 98.1 °F (36.7 °C) (Oral)   Resp 18   Ht 5' 4\" (1.626 m)   Wt 171 lb (77.6 kg)   SpO2 97%   BMI 29.35 kg/m² Recheck 120/96 L manual 
 
Physical Exam  
Constitutional: She is oriented to person, place, and time. She appears well-developed and well-nourished. No distress. HENT:  
Head: Normocephalic and atraumatic. Right Ear: Tympanic membrane, external ear and ear canal normal.  
Left Ear: Tympanic membrane, external ear and ear canal normal.  
Nose: Nose normal.  
Mouth/Throat: Uvula is midline, oropharynx is clear and moist and mucous membranes are normal. No oropharyngeal exudate, posterior oropharyngeal edema, posterior oropharyngeal erythema or tonsillar abscesses. Eyes: Conjunctivae are normal. Pupils are equal, round, and reactive to light. No scleral icterus. Neck: Neck supple. Carotid bruit is not present. No thyroid mass and no thyromegaly present. Left neck with palpable shunt. Cardiovascular: Normal rate, regular rhythm and normal heart sounds. Exam reveals no gallop. No murmur heard. Pulses: Dorsalis pedis pulses are 2+ on the right side, and 2+ on the left side. Posterior tibial pulses are 2+ on the right side, and 2+ on the left side. No pedal edema. Pulmonary/Chest: Effort normal and breath sounds normal. No respiratory distress. She has no decreased breath sounds. She has no wheezes. She has no rhonchi. She has no rales. Lymphadenopathy:  
     Head (right side): No submandibular and no tonsillar adenopathy present. Head (left side): No submandibular and no tonsillar adenopathy present. She has no cervical adenopathy. Right: No supraclavicular adenopathy present. Left: No supraclavicular adenopathy present. Neurological: She is alert and oriented to person, place, and time. Skin: Skin is warm and dry. Psychiatric: She has a normal mood and affect. Her speech is normal.  
 
 
Results for orders placed or performed in visit on 01/18/19 AMB POC URINALYSIS DIP STICK AUTO W/O MICRO Result Value Ref Range Color (UA POC) CIT Group Clarity (UA POC) Clear Glucose (UA POC) Negative Negative Bilirubin (UA POC) Negative Negative Ketones (UA POC) Negative Negative Specific gravity (UA POC) 1.030 1.001 - 1.035 Blood (UA POC) Negative Negative pH (UA POC) 5.0 4.6 - 8.0 Protein (UA POC) Trace Negative Urobilinogen (UA POC) 0.2 mg/dL 0.2 - 1 Nitrites (UA POC) Negative Negative Leukocyte esterase (UA POC) Trace Negative ASSESSMENT and PLAN Orders Placed This Encounter  METABOLIC PANEL, COMPREHENSIVE Standing Status:   Future Standing Expiration Date:   1/19/2020  TSH AND FREE T4  
  Standing Status:   Future Standing Expiration Date:   1/19/2020  MICROALBUMIN, UR, RAND W/ MICROALB/CREAT RATIO Standing Status:   Future Standing Expiration Date:   1/19/2020  CBC WITH AUTOMATED DIFF Standing Status:   Future Standing Expiration Date:   1/19/2020  REFERRAL TO NEUROLOGY Referral Priority:   Routine Referral Type:   Consultation Referral Reason:   Specialty Services Required Number of Visits Requested:   1  AMB POC URINALYSIS DIP STICK AUTO W/O MICRO  PARoxetine (PAXIL) 20 mg tablet Sig: take 1 tablet by mouth once daily Dispense:  30 Tab Refill:  3  
 labetalol (NORMODYNE) 100 mg tablet Sig: Take 1 Tab by mouth two (2) times a day. Dispense:  60 Tab Refill:  1  
 amLODIPine (NORVASC) 5 mg tablet Sig: Take 1 Tab by mouth daily. Dispense:  30 Tab Refill:  1 Diagnoses and all orders for this visit: 1. Essential hypertension 
-     labetalol (NORMODYNE) 100 mg tablet; Take 1 Tab by mouth two (2) times a day. -     amLODIPine (NORVASC) 5 mg tablet; Take 1 Tab by mouth daily. 
- Dosage decrease of BP meds given mild BP elevation today. Recheck 1 month and will increase back to previous dosage if needed. -     METABOLIC PANEL, COMPREHENSIVE; Future -     CBC WITH AUTOMATED DIFF; Future -     AMB POC URINALYSIS DIP STICK AUTO W/O MICRO 2. Depression, unspecified depression type -     PARoxetine (PAXIL) 20 mg tablet; take 1 tablet by mouth once daily 3. Elevated serum creatinine -     MICROALBUMIN, UR, RAND W/ MICROALB/CREAT RATIO; Future 4. History of hyperthyroidism 
-     TSH AND FREE T4; Future 5. History of intracranial aneurysm 6. Intracranial shunt 
-     REFERRAL TO NEUROLOGY Follow-up Disposition: 
Return in about 1 month (around 2/18/2019) for follow up BP, lab.

## 2019-01-18 NOTE — PROGRESS NOTES
Rm:1 
 
Chief Complaint Patient presents with  Medication Refill Depression Screening: PHQ over the last two weeks 1/18/2019 2/2/2018 10/6/2017 Little interest or pleasure in doing things Not at all Not at all Not at all Feeling down, depressed, irritable, or hopeless Not at all Not at all Not at all Total Score PHQ 2 0 0 0 Learning Assessment: 
Learning Assessment 9/26/2017 PRIMARY LEARNER Patient HIGHEST LEVEL OF EDUCATION - PRIMARY LEARNER  DID NOT GRADUATE HIGH SCHOOL PRIMARY LANGUAGE ENGLISH  
LEARNER PREFERENCE PRIMARY DEMONSTRATION  
ANSWERED BY patient RELATIONSHIP SELF Abuse Screening: No flowsheet data found. Health Maintenance reviewed and discussed per provider: yes Coordination of Care: 1. Have you been to the ER, urgent care clinic since your last visit? Hospitalized since your last visit? no 
 
2. Have you seen or consulted any other health care providers outside of the [address] [place] [address] since your last visit? Include any pap smears or colon screening.  no

## 2019-01-19 LAB
CREAT UR-MCNC: 435 MG/DL (ref 30–125)
MICROALBUMIN UR-MCNC: 3.17 MG/DL (ref 0–3)
MICROALBUMIN/CREAT UR-RTO: 7 MG/G (ref 0–30)

## 2019-01-21 NOTE — PROGRESS NOTES
Attempted to reach pt at both numbers in chart, local # states that pt is unavailable and not able to leave vm due to it being full, 2nd contact states that pt is unavailable and has a vm that has not been set up yet.  Will send letter

## 2019-01-21 NOTE — PROGRESS NOTES
Please notify Ms. Mayra Elise that her labs were stable. Specifically, her kidney function has declined but has been stable over the past year. We need to closely monitor this. I would still like to meet back together in 1 month to recheck her blood pressure. Please schedule if possible.

## 2019-01-25 ENCOUNTER — TELEPHONE (OUTPATIENT)
Dept: FAMILY MEDICINE CLINIC | Age: 64
End: 2019-01-25

## 2019-01-25 NOTE — PROGRESS NOTES
Attempted to reach pt, lady that answered stated she was not available, mess was left for return call

## 2019-01-28 NOTE — TELEPHONE ENCOUNTER
Called patient to inform about lab results, no answer. Message not left due to no mailbox being set up.

## 2019-01-31 NOTE — PROGRESS NOTES
Pt presented in office on 1/30/19 to have get lab results. Pt was advised pf previous message from DHIRAJ Donahue.  Pt verbalized understanding and stated that she would make 1 month f/u appt

## 2019-04-23 DIAGNOSIS — I10 ESSENTIAL HYPERTENSION: ICD-10-CM

## 2019-04-24 RX ORDER — AMLODIPINE BESYLATE 5 MG/1
TABLET ORAL
Qty: 30 TAB | Refills: 0 | OUTPATIENT
Start: 2019-04-24

## 2019-04-24 RX ORDER — LABETALOL 100 MG/1
TABLET, FILM COATED ORAL
Qty: 60 TAB | Refills: 0 | OUTPATIENT
Start: 2019-04-24

## 2019-04-25 NOTE — TELEPHONE ENCOUNTER
Attempted to contact patient at  number, no answer. Patient VM not set up. Will continue to try to contact patient.

## 2019-04-26 NOTE — TELEPHONE ENCOUNTER
Attempted to contact patient at  number, no answer. Patient mailbox has not been set up. I have been unable to reach this patient by phone. Encounter will be closed.